# Patient Record
Sex: MALE | Race: BLACK OR AFRICAN AMERICAN | Employment: FULL TIME | URBAN - METROPOLITAN AREA
[De-identification: names, ages, dates, MRNs, and addresses within clinical notes are randomized per-mention and may not be internally consistent; named-entity substitution may affect disease eponyms.]

---

## 2018-02-13 ENCOUNTER — OFFICE VISIT (OUTPATIENT)
Dept: FAMILY MEDICINE CLINIC | Facility: CLINIC | Age: 38
End: 2018-02-13

## 2018-02-13 VITALS
HEIGHT: 74 IN | DIASTOLIC BLOOD PRESSURE: 70 MMHG | RESPIRATION RATE: 12 BRPM | TEMPERATURE: 97 F | BODY MASS INDEX: 25.93 KG/M2 | WEIGHT: 202 LBS | HEART RATE: 76 BPM | SYSTOLIC BLOOD PRESSURE: 108 MMHG

## 2018-02-13 DIAGNOSIS — J01.90 ACUTE SINUSITIS, RECURRENCE NOT SPECIFIED, UNSPECIFIED LOCATION: Primary | ICD-10-CM

## 2018-02-13 DIAGNOSIS — L73.9 FOLLICULITIS: ICD-10-CM

## 2018-02-13 PROCEDURE — 99213 OFFICE O/P EST LOW 20 MIN: CPT | Performed by: FAMILY MEDICINE

## 2018-02-13 RX ORDER — AMOXICILLIN AND CLAVULANATE POTASSIUM 875; 125 MG/1; MG/1
1 TABLET, FILM COATED ORAL 2 TIMES DAILY
Qty: 20 TABLET | Refills: 0 | Status: SHIPPED | OUTPATIENT
Start: 2018-02-13 | End: 2018-02-21 | Stop reason: SINTOL

## 2018-02-13 NOTE — PROGRESS NOTES
CHIEF COMPLAINT:   Sick  HPI:   Modesta Worthington is a 40year old male who presents for upper respiratory symptoms for  12  days. Patient reports congestion. Hot and cold feeling. Throat feels swollen. Cough. Dry cough.    PND in AM.   Some left maxilla masses, no adenopathy.   LUNGS: clear to auscultation, no W/R/R.  CARDIO: RRR without pathologic murmur    ASSESSMENT AND PLAN:   Acute sinusitis, recurrence not specified, unspecified location  (primary encounter diagnosis)  Folliculitis    No orders of th

## 2018-02-21 ENCOUNTER — TELEPHONE (OUTPATIENT)
Dept: FAMILY MEDICINE CLINIC | Facility: CLINIC | Age: 38
End: 2018-02-21

## 2018-02-21 DIAGNOSIS — R11.10 VOMITING, INTRACTABILITY OF VOMITING NOT SPECIFIED, PRESENCE OF NAUSEA NOT SPECIFIED, UNSPECIFIED VOMITING TYPE: ICD-10-CM

## 2018-02-21 DIAGNOSIS — R19.7 DIARRHEA, UNSPECIFIED TYPE: Primary | ICD-10-CM

## 2018-02-21 NOTE — TELEPHONE ENCOUNTER
Severe diarrhea, stomach rumbling all the time, vomited last night. Hungry but not able to eat. Sinus infection is improving.

## 2018-02-21 NOTE — TELEPHONE ENCOUNTER
Stop antibiotic. If lightheaded, tachycardia, or continued vomiting today, to ER for evaluation. Check C diff.

## 2018-02-21 NOTE — TELEPHONE ENCOUNTER
Discussed below with pt. Reinforced supportive care for the diarrhea. He voiced understanding. C-diff pended. He agrees to  kit tomorrow at the lab if sx's still persisting. Please sign thanks.

## 2018-02-21 NOTE — TELEPHONE ENCOUNTER
Spoke with the patient and he stated that since about the 2-3 day after starting antibiotics he has   Been having severe diarrhea and vomiting. He can't eat nor can he get his stomach to settle.   He stated that he is afraid to take another pill because of

## 2019-01-15 ENCOUNTER — OFFICE VISIT (OUTPATIENT)
Dept: FAMILY MEDICINE CLINIC | Facility: CLINIC | Age: 39
End: 2019-01-15
Payer: COMMERCIAL

## 2019-01-15 VITALS
RESPIRATION RATE: 20 BRPM | HEIGHT: 74 IN | WEIGHT: 203 LBS | TEMPERATURE: 98 F | BODY MASS INDEX: 26.05 KG/M2 | SYSTOLIC BLOOD PRESSURE: 122 MMHG | DIASTOLIC BLOOD PRESSURE: 80 MMHG | OXYGEN SATURATION: 99 % | HEART RATE: 72 BPM

## 2019-01-15 DIAGNOSIS — M62.830 MUSCLE SPASM OF BACK: Primary | ICD-10-CM

## 2019-01-15 PROCEDURE — 99214 OFFICE O/P EST MOD 30 MIN: CPT | Performed by: FAMILY MEDICINE

## 2019-01-15 RX ORDER — CYCLOBENZAPRINE HCL 10 MG
10 TABLET ORAL NIGHTLY PRN
Qty: 30 TABLET | Refills: 0 | Status: SHIPPED | OUTPATIENT
Start: 2019-01-15 | End: 2021-06-16

## 2019-01-15 RX ORDER — PREDNISONE 20 MG/1
TABLET ORAL
Qty: 20 TABLET | Refills: 0 | Status: SHIPPED | OUTPATIENT
Start: 2019-01-15 | End: 2021-08-11 | Stop reason: ALTCHOICE

## 2019-01-15 NOTE — PROGRESS NOTES
[facility] Office Note  Chief Complaint:   Patient presents with:  Back Pain      HPI:   This is a 41year old male coming in for back pain. Pain is located at low back. Pain is described as aching, cramping, sharp.  Severity is m chest discomfort. No palpitations or edema.   Denies any dyspnea on exertion or at rest  RESPIRATORY:  Denies shortness of breath, cough  GASTROINTESTINAL:  Denies any abdominal pain, nausea, vomiting  NEUROLOGICAL:  Denies headache, dizziness, syncope, num x 3d, 2 tabs PO daily x 3d, 1 tab PO daily x 3d, 1/2 tab PO daily x 3d       Health Maintenance:  Annual Depression Screen due on 07/26/1993  Annual Physical due on 04/20/2016  Influenza Vaccine(1) due on 09/01/2018    Patient/Caregiver Education: Debbie Cornell

## 2019-10-16 NOTE — LETTER
Date: 7/23/2021    Patient Name: Kye Rojas. To Whom it may concern: This letter has been written at the patient's request. The above patient was seen at the [facility] for treatment of a medical condition.     This patient
Yes...

## 2019-11-22 ENCOUNTER — HOSPITAL ENCOUNTER (OUTPATIENT)
Age: 39
Discharge: HOME | End: 2019-11-22
Payer: COMMERCIAL

## 2019-11-22 VITALS
TEMPERATURE: 98 F | OXYGEN SATURATION: 97 % | SYSTOLIC BLOOD PRESSURE: 129 MMHG | DIASTOLIC BLOOD PRESSURE: 79 MMHG | RESPIRATION RATE: 16 BRPM | HEART RATE: 73 BPM

## 2019-11-22 DIAGNOSIS — L02.31 LEFT BUTTOCK ABSCESS: Primary | ICD-10-CM

## 2019-11-22 PROCEDURE — 87077 CULTURE AEROBIC IDENTIFY: CPT | Performed by: PHYSICIAN ASSISTANT

## 2019-11-22 PROCEDURE — 87070 CULTURE OTHR SPECIMN AEROBIC: CPT | Performed by: PHYSICIAN ASSISTANT

## 2019-11-22 PROCEDURE — 87186 SC STD MICRODIL/AGAR DIL: CPT | Performed by: PHYSICIAN ASSISTANT

## 2019-11-22 PROCEDURE — 10060 I&D ABSCESS SIMPLE/SINGLE: CPT

## 2019-11-22 PROCEDURE — 87205 SMEAR GRAM STAIN: CPT | Performed by: PHYSICIAN ASSISTANT

## 2019-11-22 PROCEDURE — 99214 OFFICE O/P EST MOD 30 MIN: CPT

## 2019-11-22 PROCEDURE — 99204 OFFICE O/P NEW MOD 45 MIN: CPT

## 2019-11-22 PROCEDURE — 90471 IMMUNIZATION ADMIN: CPT

## 2019-11-22 RX ORDER — CEPHALEXIN 500 MG/1
500 CAPSULE ORAL 4 TIMES DAILY
Qty: 40 CAPSULE | Refills: 0 | Status: SHIPPED | OUTPATIENT
Start: 2019-11-22 | End: 2019-12-02

## 2019-11-22 RX ORDER — CHLORHEXIDINE GLUCONATE 4 G/100ML
30 SOLUTION TOPICAL
Qty: 1 BOTTLE | Refills: 0 | Status: SHIPPED | OUTPATIENT
Start: 2019-11-22 | End: 2021-06-07 | Stop reason: ALTCHOICE

## 2019-11-22 NOTE — ED PROVIDER NOTES
Patient Seen in: [facility]      History   Patient presents with:  Bump    Stated Complaint: 2 DAYS NOTICED \"BUMP\" ON BUTTOCKS    HPI    50-year-old male here with complaint of a tender bump he noted to his left buttock.   Antonieta and external ear normal.      Left Ear: Tympanic membrane and external ear normal.      Nose: Nose normal.      Mouth/Throat:      Mouth: Mucous membranes are moist.   Eyes:      Conjunctiva/sclera: Conjunctivae normal.      Pupils: Pupils are equal, round understanding. All questions and concerns are addressed to the patients satisfaction prior to discharge today.                  Disposition and Plan     Clinical Impression:  Left buttock abscess  (primary encounter diagnosis)    Disposition:  Discharge  11

## 2019-11-22 NOTE — ED INITIAL ASSESSMENT (HPI)
Left buttock-  Noted yesterday with pain . No drainage noted. He states it was about the half a golf ball under then skin. Today he states it subsided a little.    Denies fever,  Pt has h/o abscess in the past.

## 2020-01-08 RX ORDER — OSELTAMIVIR PHOSPHATE 75 MG/1
75 CAPSULE ORAL DAILY
Qty: 10 CAPSULE | Refills: 0 | Status: SHIPPED | OUTPATIENT
Start: 2020-01-08 | End: 2021-08-11 | Stop reason: ALTCHOICE

## 2020-03-16 ENCOUNTER — TELEPHONE (OUTPATIENT)
Dept: FAMILY MEDICINE CLINIC | Facility: CLINIC | Age: 40
End: 2020-03-16

## 2020-03-16 NOTE — TELEPHONE ENCOUNTER
Patient states his wife called in and she called in and was told to watch symptoms. Patient states that he is feeling better and is needing a note so that he can go back to work.

## 2020-03-16 NOTE — TELEPHONE ENCOUNTER
Review of record shows no recent call from pt prior to today re not feeling well. Call to pt for more information re any symptoms-he sts \"my father just  seconds ago. Don't want to talk about anything else right now. \"  Offered my condolences and ad

## 2020-07-23 ENCOUNTER — TELEPHONE (OUTPATIENT)
Dept: FAMILY MEDICINE CLINIC | Facility: CLINIC | Age: 40
End: 2020-07-23

## 2020-07-23 NOTE — TELEPHONE ENCOUNTER
S/W pt and he states he had diarrhea 4-6 x yesterday with stool appearing dark brown in appearance. Then at 9 PM last night he c/o right mid stomach cramping with no diarrhea. Also c/o bright blood when he wiped.       Pt denies any nausea and vomiting, n

## 2020-07-23 NOTE — TELEPHONE ENCOUNTER
1. What are your symptoms? Blood on tp when wiping 4 times yesterday stool darker/diarrhea, sever stomach cramps       2. How long have you been having these symptoms? yesterday    3. Have you done anything already to treat your symptoms?          ADDIT

## 2020-07-23 NOTE — TELEPHONE ENCOUNTER
Monitor symptoms. It is common to have mild abdominal cramping after persistent diarrhea. He also likely has blood when he wipes because he is gone to the bathroom so many times and is probably irritated.   As long as he is not having any fever or worseni

## 2020-07-23 NOTE — TELEPHONE ENCOUNTER
Pt called back. Advised of below. He voiced understanding. I also suggested BRAT diet, bland foods, avoid dairy. He agrees.

## 2021-06-07 ENCOUNTER — OFFICE VISIT (OUTPATIENT)
Dept: FAMILY MEDICINE CLINIC | Facility: CLINIC | Age: 41
End: 2021-06-07
Payer: COMMERCIAL

## 2021-06-07 VITALS
HEIGHT: 73 IN | WEIGHT: 200 LBS | SYSTOLIC BLOOD PRESSURE: 110 MMHG | DIASTOLIC BLOOD PRESSURE: 80 MMHG | BODY MASS INDEX: 26.51 KG/M2 | HEART RATE: 86 BPM | RESPIRATION RATE: 18 BRPM | TEMPERATURE: 99 F | OXYGEN SATURATION: 97 %

## 2021-06-07 DIAGNOSIS — S39.012A STRAIN OF LUMBAR REGION, INITIAL ENCOUNTER: ICD-10-CM

## 2021-06-07 DIAGNOSIS — S16.1XXA STRAIN OF NECK MUSCLE, INITIAL ENCOUNTER: Primary | ICD-10-CM

## 2021-06-07 PROCEDURE — 3079F DIAST BP 80-89 MM HG: CPT | Performed by: FAMILY MEDICINE

## 2021-06-07 PROCEDURE — 3008F BODY MASS INDEX DOCD: CPT | Performed by: FAMILY MEDICINE

## 2021-06-07 PROCEDURE — 3074F SYST BP LT 130 MM HG: CPT | Performed by: FAMILY MEDICINE

## 2021-06-07 PROCEDURE — 99214 OFFICE O/P EST MOD 30 MIN: CPT | Performed by: FAMILY MEDICINE

## 2021-06-07 RX ORDER — PREDNISONE 20 MG/1
TABLET ORAL
Qty: 20 TABLET | Refills: 0 | Status: SHIPPED | OUTPATIENT
Start: 2021-06-07 | End: 2021-06-16

## 2021-06-07 RX ORDER — CYCLOBENZAPRINE HCL 10 MG
10 TABLET ORAL NIGHTLY PRN
Qty: 30 TABLET | Refills: 0 | Status: SHIPPED | OUTPATIENT
Start: 2021-06-07 | End: 2021-08-16

## 2021-06-07 NOTE — PROGRESS NOTES
[address] [facility] Office Note  Chief Complaint:   Patient presents with:  Low Back Pain: upper back, right shoulder, right neck, right buttocks      HPI:   This is a 45year old male coming in for neck and back pain.   The patient has com Phosphate (TAMIFLU) 75 MG Oral Cap Take 1 capsule (75 mg total) by mouth daily. (Patient not taking: Reported on 6/7/2021 ) 10 capsule 0   • Cyclobenzaprine HCl 10 MG Oral Tab Take 1 tablet (10 mg total) by mouth nightly as needed for Muscle spasms.  (Dajuan rate and rhythm, no murmurs, rubs or gallops  ABDOMEN:  Soft, nondistended, nontender, bowel sounds normal in all 4 quadrants, no hepatosplenomegaly  EXTREMITIES:  Strength intact with 5/5 bilaterally upper and lower extremities, no edema noted  BACK: + ti learning. Medical education done. Outcome: Patient verbalizes understanding. Patient is notified to call with any questions, complications, allergies, or worsening or changing symptoms.   Patient is to call with any side effects or complications from the

## 2021-06-08 ENCOUNTER — TELEPHONE (OUTPATIENT)
Dept: FAMILY MEDICINE CLINIC | Facility: CLINIC | Age: 41
End: 2021-06-08

## 2021-06-08 DIAGNOSIS — S39.012A STRAIN OF LUMBAR REGION, INITIAL ENCOUNTER: Primary | ICD-10-CM

## 2021-06-08 DIAGNOSIS — M54.50 LOW BACK PAIN, UNSPECIFIED BACK PAIN LATERALITY, UNSPECIFIED CHRONICITY, UNSPECIFIED WHETHER SCIATICA PRESENT: ICD-10-CM

## 2021-06-08 NOTE — TELEPHONE ENCOUNTER
PT WOULD LIKE TO L;OOK INTO FMLA OR disability. They said he would need updated testing MRI ect. He would like to know where to start. Please advise. Thank you.

## 2021-06-08 NOTE — TELEPHONE ENCOUNTER
He should be able to obtain info on these from his employer. Has he checked there? Once he receives forms that can help better guide on what is needed.

## 2021-06-09 NOTE — TELEPHONE ENCOUNTER
If patient is looking for FMLA based on his back pain, he would have to see a specialist moving forward. Please refer him to Dr. Jessica Pillai from neurosurgery. He will evaluate the patient in order any appropriate tests.

## 2021-06-09 NOTE — TELEPHONE ENCOUNTER
Patient called back. He received forms for from his employer and HR told him he needs to have an  MRI and any testing for his chronic back pain in order to have [place] approved.  HR also asked him to get a note from his doctor stating that patient needs accom

## 2021-06-16 ENCOUNTER — OFFICE VISIT (OUTPATIENT)
Dept: SURGERY | Facility: CLINIC | Age: 41
End: 2021-06-16
Payer: COMMERCIAL

## 2021-06-16 VITALS
WEIGHT: 200 LBS | DIASTOLIC BLOOD PRESSURE: 90 MMHG | SYSTOLIC BLOOD PRESSURE: 132 MMHG | BODY MASS INDEX: 26.51 KG/M2 | HEIGHT: 73 IN

## 2021-06-16 DIAGNOSIS — M62.830 SPASM OF THORACIC BACK MUSCLE: ICD-10-CM

## 2021-06-16 DIAGNOSIS — M62.838 MUSCLE SPASM: ICD-10-CM

## 2021-06-16 DIAGNOSIS — M51.36 DDD (DEGENERATIVE DISC DISEASE), LUMBAR: ICD-10-CM

## 2021-06-16 DIAGNOSIS — M47.816 ARTHRITIS OF FACET JOINT OF LUMBAR SPINE: ICD-10-CM

## 2021-06-16 DIAGNOSIS — M54.2 TRIGGER POINT WITH NECK PAIN: ICD-10-CM

## 2021-06-16 DIAGNOSIS — M53.3 PAIN OF RIGHT SACROILIAC JOINT: ICD-10-CM

## 2021-06-16 DIAGNOSIS — M51.26 BULGING LUMBAR DISC: ICD-10-CM

## 2021-06-16 DIAGNOSIS — M62.89 MUSCLE TIGHTNESS: ICD-10-CM

## 2021-06-16 DIAGNOSIS — M47.817 LUMBOSACRAL FACET JOINT SYNDROME: Primary | ICD-10-CM

## 2021-06-16 PROCEDURE — 99204 OFFICE O/P NEW MOD 45 MIN: CPT | Performed by: PHYSICIAN ASSISTANT

## 2021-06-16 PROCEDURE — 3008F BODY MASS INDEX DOCD: CPT | Performed by: PHYSICIAN ASSISTANT

## 2021-06-16 PROCEDURE — 3075F SYST BP GE 130 - 139MM HG: CPT | Performed by: PHYSICIAN ASSISTANT

## 2021-06-16 PROCEDURE — 3080F DIAST BP >= 90 MM HG: CPT | Performed by: PHYSICIAN ASSISTANT
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
## 2021-06-16 NOTE — PROGRESS NOTES
Patient: Junie Lomax.   Medical Record Number: [id number]  YOB: 1980  PCP: Contreras Moran MD    Referring Physician: Rome Tabor  Reason for visit: Patient presents with:  New Patient: Back pain      Dear Dr. Yunior Delcid on file.    Family History   Problem Relation Age of Onset   • Other (Other[other]) Father         Alcoholic   • Other (Other[other]) Sister         lupus      Social History    Socioeconomic History      Marital status:       Spouse name: Not on ana is outlined in the above HPI. PHYSICAL EXAMIMATION    vitals were not taken for this visit. GENERAL: Very pleasant patient is in no apparent distress. Sitting comfortably in the examination chair. HEENT: Normocephalic, atraumatic.   RESPIRATORY RATE ([id number]) [id number]                                                                                            Exam Date: 01/14/2011                EXAMS: [id number] SPINE LUMBAR W\O CONTRAST MRI            PROCEDURE:     MRI OF THE LUMBAR SPINE WITHOUT CONTRAST compromise                 CONCLUSION:     Degenerative disc disease with multilevel bulge.                      Moderate central L5-S1 protrusion.  Mild narrowing of the L5            PAGE 1               Signed Report Printed From [facility]    (CONTINUED)      disc bulge without significant spinal stenosis. - Ordered today:    - XR lumbar spine with flex/ext:     - Assess mechanical back pain to rule out other contributing factors beyond lumbar facet syndrome   3. Activity:    - New PT order.  Patient unable t

## 2021-07-16 ENCOUNTER — TELEPHONE (OUTPATIENT)
Dept: PHYSICAL THERAPY | Facility: HOSPITAL | Age: 41
End: 2021-07-16

## 2021-07-19 ENCOUNTER — APPOINTMENT (OUTPATIENT)
Dept: PHYSICAL THERAPY | Facility: HOSPITAL | Age: 41
End: 2021-07-19
Attending: FAMILY MEDICINE

## 2021-07-22 ENCOUNTER — TELEPHONE (OUTPATIENT)
Dept: SURGERY | Facility: CLINIC | Age: 41
End: 2021-07-22

## 2021-07-22 RX ORDER — METHYLPREDNISOLONE 4 MG/1
TABLET ORAL
Qty: 1 EACH | Refills: 0 | Status: SHIPPED | OUTPATIENT
Start: 2021-07-22 | End: 2021-08-06

## 2021-07-22 NOTE — TELEPHONE ENCOUNTER
Attempted to call pt to assess symptoms, pt was not available to take the call and VMbox has not been set up, unable to leave message.

## 2021-07-22 NOTE — TELEPHONE ENCOUNTER
Pt states he had some relief from pain after taking steroid. Now pain is back and is even worse. Muscle relaxer is not helping. Pt states it is excruciating.   Pls call pt to discuss/advise

## 2021-07-22 NOTE — TELEPHONE ENCOUNTER
S: Pt calling stating his is in excrusiating pain      B: Pt seen by [place] on 6/16/21    Per Brandon Anaya   \" ASSESSMENT and PLAN:  (M49.887) Lumbosacral facet joint syndrome  (primary encounter diagnosis)     (M51.36) DDD (degenerative disc stomach and that the muscle relaxer does nothing for the pain. Experiencing pain when shirt touches back, feels like spine is bruised. Has PT eval on Monday. Pt inquiring if we have any advise on relieving pain.  Informed pt of ice/heat treatment, Tylenol,

## 2021-07-22 NOTE — TELEPHONE ENCOUNTER
Tried calling patient at mobile cell phone. Voicemail box not yet set up.     If patient calls back, please discuss if he is experiencing any new symptoms, including but not limited to warning/red flag signs/symptoms, bladder/bowel incontinence/retention o

## 2021-07-22 NOTE — TELEPHONE ENCOUNTER
Patient returned call. He states that his back pain is unbearable, also having neck and shoulder pain. It feels better to lay on his stomach or in a recliner. He is not taking any pain medication.   He states his lower back feels \"like a bruise\" but it

## 2021-07-23 ENCOUNTER — TELEPHONE (OUTPATIENT)
Dept: SURGERY | Facility: CLINIC | Age: 41
End: 2021-07-23

## 2021-07-23 ENCOUNTER — OFFICE VISIT (OUTPATIENT)
Dept: SURGERY | Facility: CLINIC | Age: 41
End: 2021-07-23
Payer: COMMERCIAL

## 2021-07-23 DIAGNOSIS — M54.12 CERVICAL MYELOPATHY WITH CERVICAL RADICULOPATHY (HCC): Primary | ICD-10-CM

## 2021-07-23 DIAGNOSIS — G95.9 CERVICAL MYELOPATHY WITH CERVICAL RADICULOPATHY (HCC): Primary | ICD-10-CM

## 2021-07-23 DIAGNOSIS — R29.898 WEAKNESS OF BOTH LOWER EXTREMITIES: ICD-10-CM

## 2021-07-23 DIAGNOSIS — R29.898 WEAKNESS OF BOTH UPPER EXTREMITIES: ICD-10-CM

## 2021-07-23 DIAGNOSIS — M51.16 LUMBAR DISC HERNIATION WITH RADICULOPATHY: ICD-10-CM

## 2021-07-23 DIAGNOSIS — R26.9 NEUROLOGIC GAIT DYSFUNCTION: ICD-10-CM

## 2021-07-23 PROCEDURE — 99215 OFFICE O/P EST HI 40 MIN: CPT | Performed by: PHYSICIAN ASSISTANT

## 2021-07-23 NOTE — TELEPHONE ENCOUNTER
Received DME orders from provider for [facility] therapy collar.   Patient face sheet, insurance information, and LOV notes printed and faxed [facility], fax number:    [phone]

## 2021-07-23 NOTE — PROGRESS NOTES
[facility]   Follow-up      HISTORY OF PRESENT ILLNESS:Harjinder Ernst. is a 45year old male gives a history of having back pain on and off since he was 14years old.  He has been able to manage up to the last couple years now he is having severe spasticity in the left lower extremity he has tight hamstrings bilaterally    He has tight paraspinal muscles in the right thoracic and lumbar region. He has difficulty bending. He was put in a [place] therapy collar his back spasms improved.  His back boris

## 2021-07-23 NOTE — PATIENT INSTRUCTIONS
Refill policies:    • Allow 2-3 business days for refills; controlled substances may take longer.   • Contact your pharmacy at least 5 days prior to running out of medication and have them send an electronic request or submit request through the “request re Depending on your insurance carrier, approval may take 3-10 days. It is highly recommended patients contact their insurance carrier directly to determine coverage.   If test is done without insurance authorization, patient may be responsible for the entire spine  -MRI of the lumbar spine  -Hold physical therapy next week and to reevaluate his neck  -Follow-up after imaging

## 2021-07-26 ENCOUNTER — APPOINTMENT (OUTPATIENT)
Dept: PHYSICAL THERAPY | Facility: HOSPITAL | Age: 41
End: 2021-07-26
Attending: FAMILY MEDICINE

## 2021-07-27 ENCOUNTER — HOSPITAL ENCOUNTER (OUTPATIENT)
Dept: GENERAL RADIOLOGY | Age: 41
Discharge: HOME | End: 2021-07-27
Attending: PHYSICIAN ASSISTANT
Payer: COMMERCIAL

## 2021-07-27 DIAGNOSIS — R26.9 NEUROLOGIC GAIT DYSFUNCTION: ICD-10-CM

## 2021-07-27 DIAGNOSIS — G95.9 CERVICAL MYELOPATHY WITH CERVICAL RADICULOPATHY (HCC): ICD-10-CM

## 2021-07-27 DIAGNOSIS — R29.898 WEAKNESS OF BOTH UPPER EXTREMITIES: ICD-10-CM

## 2021-07-27 DIAGNOSIS — R29.898 WEAKNESS OF BOTH LOWER EXTREMITIES: ICD-10-CM

## 2021-07-27 DIAGNOSIS — M54.12 CERVICAL MYELOPATHY WITH CERVICAL RADICULOPATHY (HCC): ICD-10-CM

## 2021-07-27 PROCEDURE — 72050 X-RAY EXAM NECK SPINE 4/5VWS: CPT | Performed by: PHYSICIAN ASSISTANT

## 2021-07-28 ENCOUNTER — TELEPHONE (OUTPATIENT)
Dept: SURGERY | Facility: CLINIC | Age: 41
End: 2021-07-28

## 2021-07-28 NOTE — TELEPHONE ENCOUNTER
Received Disability Paperwork from [facility] regarding claim # [id number]. Forwarded to Provider for review.

## 2021-07-28 NOTE — TELEPHONE ENCOUNTER
Patient called requesting fax number to office. Indicated Lawrence Overall will be faxing over disability [facility] paperwork to be filled out and returned by 8/6/2021.   Fax number given [phone]

## 2021-07-29 NOTE — TELEPHONE ENCOUNTER
Upon review of form it is for RTW with limitations, not FMLA forms. We have not received FMLA forms. Called pt to inform and instructed to call Jesusricardo Berenice cuadra inform them that they have sent the incorrect forms and FMLA is needed for pt to be off of work. Pt verbalized understanding and appreciative of call.

## 2021-07-30 NOTE — TELEPHONE ENCOUNTER
Received disability ppwk via fax from [employer] [address] dated 7.30.21. Endorsed to [facility].

## 2021-08-02 ENCOUNTER — TELEPHONE (OUTPATIENT)
Dept: SURGERY | Facility: CLINIC | Age: 41
End: 2021-08-02

## 2021-08-02 ENCOUNTER — APPOINTMENT (OUTPATIENT)
Dept: PHYSICAL THERAPY | Facility: HOSPITAL | Age: 41
End: 2021-08-02
Payer: COMMERCIAL

## 2021-08-02 NOTE — TELEPHONE ENCOUNTER
LMTCB:    S:  Patient came in to office to discuss message below. B: pt was seen in the office by both Sudha and Fay. Pt has been off work since 7/23/21- until next office visit- 8/6/21    Per LOV notes on 7/23/21 from [employer]

## 2021-08-02 NOTE — TELEPHONE ENCOUNTER
If patient would like to RTW without restrictions, will leave this up to the patient. If the patient would like to RTW with restrictions, we can provide letter with restrictions if that assists?     Lifting restrictions, frequent breaks etc.    If he'd l

## 2021-08-02 NOTE — TELEPHONE ENCOUNTER
Patient calling to speak with Nurse. States when he was at his last office visit his pain levels were higher than normal. Patient states he pain levels are now 5-6, which he states is normal for him.  Patient is wanting to know if he is able to return to wo

## 2021-08-03 ENCOUNTER — TELEPHONE (OUTPATIENT)
Dept: SURGERY | Facility: CLINIC | Age: 41
End: 2021-08-03

## 2021-08-03 NOTE — TELEPHONE ENCOUNTER
Received Physical Capacities Form from [employer] [address] [employer]. Endorsed to RN for completion.   OV note dated 7.23.22 included

## 2021-08-04 ENCOUNTER — TELEPHONE (OUTPATIENT)
Dept: NEUROLOGY | Facility: CLINIC | Age: 41
End: 2021-08-04

## 2021-08-04 NOTE — TELEPHONE ENCOUNTER
Attempted to call pt to discuss message below: Ch[place]    [facility] message also sent- if pt calls back make sure to review [facility] message with him

## 2021-08-04 NOTE — TELEPHONE ENCOUNTER
MRI Cervical CPT [id number] / MRI Lumbar CPT [id number] Denial reason:    CPT 93450:    This test should be used when the pain has not improved after six weeks of treatment by your doctor. Treatment should include medications and other forms of therapy.   These need

## 2021-08-04 NOTE — TELEPHONE ENCOUNTER
Received call back from pt who states he would like to remain off work until next [facility] as his work letter states.  Also inquiring about imaging; states he spoke with Chavo Arteaga [address] who stated to him that imaging orders were denied and a peer to peer will need to be com

## 2021-08-04 NOTE — TELEPHONE ENCOUNTER
Patient calling to notify Nurse that per AIM denial letter has been faxed over today. No call back necessary at this time.

## 2021-08-05 NOTE — TELEPHONE ENCOUNTER
Provider message noted. [facility] requests that providers call directly, no scheduling of P2P is necessary.       Per Skyler Alvarezma team on 8/4/21:    Santino Ac ordering provider would like to discuss this case with an [facility] physician reviewer, they can co

## 2021-08-05 NOTE — TELEPHONE ENCOUNTER
Denial of imaging received. Routed to ordering provider to inquire about scheduling P2P.      LOV notes with DARRIUS Boyce on 7/23/21:    \"IMAGING:  MRI of the lumbar spine 2011 shows minimal bulging of the lumbar spine.     ASSESSMENT:  -Cervical stenos

## 2021-08-05 NOTE — TELEPHONE ENCOUNTER
Provider message noted and routed to PA team for notification of approval.     Per DARRIUS Abraham in routing comment today:    \"Called and talked [address] [facility] MRI was approved.  Authorization number is [id number].  This order is valid

## 2021-08-05 NOTE — TELEPHONE ENCOUNTER
Yes, patient has been in 6 weeks of conservative treatment with medication therapy.  Patient with findings on exam that further support MRI cervical spine     Please schedule with next available PA

## 2021-08-06 ENCOUNTER — OFFICE VISIT (OUTPATIENT)
Dept: SURGERY | Facility: CLINIC | Age: 41
End: 2021-08-06
Payer: COMMERCIAL

## 2021-08-06 VITALS
DIASTOLIC BLOOD PRESSURE: 80 MMHG | WEIGHT: 200 LBS | SYSTOLIC BLOOD PRESSURE: 124 MMHG | BODY MASS INDEX: 26.51 KG/M2 | HEIGHT: 73 IN

## 2021-08-06 DIAGNOSIS — G95.9 CERVICAL MYELOPATHY WITH CERVICAL RADICULOPATHY (HCC): Primary | ICD-10-CM

## 2021-08-06 DIAGNOSIS — M54.12 CERVICAL MYELOPATHY WITH CERVICAL RADICULOPATHY (HCC): Primary | ICD-10-CM

## 2021-08-06 DIAGNOSIS — M51.16 LUMBAR DISC HERNIATION WITH RADICULOPATHY: ICD-10-CM

## 2021-08-06 PROCEDURE — 99214 OFFICE O/P EST MOD 30 MIN: CPT | Performed by: PHYSICIAN ASSISTANT

## 2021-08-06 PROCEDURE — 3074F SYST BP LT 130 MM HG: CPT | Performed by: PHYSICIAN ASSISTANT

## 2021-08-06 PROCEDURE — 3008F BODY MASS INDEX DOCD: CPT | Performed by: PHYSICIAN ASSISTANT

## 2021-08-06 PROCEDURE — 3079F DIAST BP 80-89 MM HG: CPT | Performed by: PHYSICIAN ASSISTANT

## 2021-08-06 NOTE — PROGRESS NOTES
Had some of the imaging done  MRI sched for tomorrow    Feeling much better than initial visit, still not 100%  Still having pain every morning and night

## 2021-08-06 NOTE — PATIENT INSTRUCTIONS
Refill policies:    • Allow 2-3 business days for refills; controlled substances may take longer.   • Contact your pharmacy at least 5 days prior to running out of medication and have them send an electronic request or submit request through the “request re Depending on your insurance carrier, approval may take 3-10 days. It is highly recommended patients contact their insurance carrier directly to determine coverage.   If test is done without insurance authorization, patient may be responsible for the entire physical therapy until after his MRI and follow-up  -He was taken off of work July 23 he will be returned to work full duty on 8/9/2021. His short-term disability paperwork was completed and given to him at his appointment today.

## 2021-08-06 NOTE — PROGRESS NOTES
[facility] Neurosurgery   Follow-up      HISTORY OF PRESENT ILLNESS:Siddharthagregory MARY Roberts. is a 37year old male returns in follow-up. Overall he has made improvement with the therapy brace he states his neck pain is a 2/10. His low back pain is better to 5/10. distress. HEENT:  Normocephalic, atraumatic  SKIN: Warm, dry, no rashes. NEUROLOGICAL:  This patient is alert and orientated x 3. Speech fluent. Comprehension intact. Face is symmetrical.    SPINE: Neck pain on flexion and extension.  Sensation to lig short-term disability paperwork was completed and given to him at his appointment today.         Jose J Munoz M.S., SURENDRA Daniels  [address], [address] include 234 beds at the [facility] Kelly, [address] [place] [address]  [phone]

## 2021-08-07 ENCOUNTER — HOSPITAL ENCOUNTER (OUTPATIENT)
Dept: MRI IMAGING | Facility: HOSPITAL | Age: 41
Discharge: HOME | End: 2021-08-07
Attending: PHYSICIAN ASSISTANT
Payer: COMMERCIAL

## 2021-08-07 DIAGNOSIS — R29.898 WEAKNESS OF BOTH LOWER EXTREMITIES: ICD-10-CM

## 2021-08-07 DIAGNOSIS — R26.9 NEUROLOGIC GAIT DYSFUNCTION: ICD-10-CM

## 2021-08-07 DIAGNOSIS — M54.12 CERVICAL MYELOPATHY WITH CERVICAL RADICULOPATHY (HCC): ICD-10-CM

## 2021-08-07 DIAGNOSIS — G95.9 CERVICAL MYELOPATHY WITH CERVICAL RADICULOPATHY (HCC): ICD-10-CM

## 2021-08-07 DIAGNOSIS — R29.898 WEAKNESS OF BOTH UPPER EXTREMITIES: ICD-10-CM

## 2021-08-09 ENCOUNTER — TELEPHONE (OUTPATIENT)
Dept: SURGERY | Facility: CLINIC | Age: 41
End: 2021-08-09

## 2021-08-09 ENCOUNTER — APPOINTMENT (OUTPATIENT)
Dept: PHYSICAL THERAPY | Facility: HOSPITAL | Age: 41
End: 2021-08-09
Payer: COMMERCIAL

## 2021-08-09 RX ORDER — DIAZEPAM 5 MG/1
5 TABLET ORAL SEE ADMIN INSTRUCTIONS
Qty: 2 TABLET | Refills: 0 | Status: SHIPPED | OUTPATIENT
Start: 2021-08-09 | End: 2021-08-16

## 2021-08-09 NOTE — TELEPHONE ENCOUNTER
Called pt to inform. No answer left VM. Called pt's spouse and informed. Pt's spouse states that he will need the day off work and requesting a work letter. Informed that we will send work letter to patients [facility].  Spouse verbalized understanding and apprecia

## 2021-08-09 NOTE — TELEPHONE ENCOUNTER
Pt's spouse calling because pt was not able to complete MRI due to anxiety. Pt would like to know if something can be prescribed to help him get through MRI scheduled for tomorrow.   [facility] [address]

## 2021-08-09 NOTE — TELEPHONE ENCOUNTER
Valium prescribed for claustrophobia. [facility]E CEJA faxed to pharmacy     Please review side effects, including but not limited to, drowsiness. Do not drive or operate machinery on this medication therapy. Patient should be driven to and from the appointment.

## 2021-08-10 ENCOUNTER — HOSPITAL ENCOUNTER (OUTPATIENT)
Dept: MRI IMAGING | Age: 41
Discharge: HOME | End: 2021-08-10
Attending: PHYSICIAN ASSISTANT
Payer: COMMERCIAL

## 2021-08-10 ENCOUNTER — TELEPHONE (OUTPATIENT)
Dept: SURGERY | Facility: CLINIC | Age: 41
End: 2021-08-10

## 2021-08-10 DIAGNOSIS — R26.9 NEUROLOGIC GAIT DYSFUNCTION: ICD-10-CM

## 2021-08-10 DIAGNOSIS — M51.26 BULGING LUMBAR DISC: ICD-10-CM

## 2021-08-10 DIAGNOSIS — R29.898 HAND WEAKNESS: ICD-10-CM

## 2021-08-10 DIAGNOSIS — M47.816 ARTHRITIS OF FACET JOINT OF LUMBAR SPINE: ICD-10-CM

## 2021-08-10 DIAGNOSIS — M54.2 NECK PAIN: Primary | ICD-10-CM

## 2021-08-10 DIAGNOSIS — R29.898 WEAKNESS OF BOTH LOWER EXTREMITIES: ICD-10-CM

## 2021-08-10 DIAGNOSIS — M62.81 MUSCLE WEAKNESS ON EXAMINATION: ICD-10-CM

## 2021-08-10 DIAGNOSIS — R25.8 CLONUS: ICD-10-CM

## 2021-08-10 NOTE — TELEPHONE ENCOUNTER
MRI cervical approved already. Peer to peer performed on MRI lumbar spine.      MRI lumbar spine approved:  [id number]  Exp 8/24/21    Dr. Houston Nice     Please advise     Given severe pain and inability to complete with valium or medication therapy, recom

## 2021-08-10 NOTE — TELEPHONE ENCOUNTER
Per 8/4/21 [facility] requests that providers call directly, no scheduling of P2P is necessary.       Per Skyler Lopezma team on 8/4/21:     \"If ordering provider would like to discuss this case with an AIM physician reviewer, they can conta

## 2021-08-10 NOTE — TELEPHONE ENCOUNTER
Per encounter dated 8/4/21 looks like only the cervical was done in the Peer to Peer. MRI Lumbar not approved. Peer to Peer can still be done.  See encounter dated 8/4/21

## 2021-08-10 NOTE — TELEPHONE ENCOUNTER
Is the patient's MRI lumbar spine approved? We're aware the MRI cervical spine is. Patient has completed 6 weeks of medication therapy. Both MRI of cervical and lumbar should be approved. If not, will reorder and perform peer to peer.      If both M

## 2021-08-10 NOTE — TELEPHONE ENCOUNTER
Pts wife, Nesha Brock, called and said pt went to have cervical MRI done today but was unable to due to being in severe pain. She is wondering what other options there are for the pt. Please call to advise.

## 2021-08-10 NOTE — TELEPHONE ENCOUNTER
Per last OV note 8-6-21   PLAN:  -[place] therapy collar continue  -MRI of the cervical spine  -MRI of the lumbar spine 8/7/2021  -Hold physical therapy until after his MRI and follow-up  -He was taken off of work July 23 he will be returned to work full dut

## 2021-08-11 RX ORDER — SODIUM CHLORIDE 9 MG/ML
INJECTION, SOLUTION INTRAVENOUS CONTINUOUS
Status: CANCELLED | OUTPATIENT
Start: 2021-08-11

## 2021-08-11 NOTE — TELEPHONE ENCOUNTER
Patient states he was advised to contact central scheduling and have both MRIs completed on the same day since he will be using anesthesia. Central Scheduling cannot accommodate both MRIs to be completed. Please contact to further discuss.

## 2021-08-11 NOTE — TELEPHONE ENCOUNTER
Can we please also confirm that insurance has no issue as these MRIs were reordered yesterday with sedation? Patient has an appointment this Friday with Ashu Kennedy PA-C.   I advise that he discuss if able to tolerate the upcoming MRIs with [facility] [address]

## 2021-08-11 NOTE — TELEPHONE ENCOUNTER
If central scheduling unable to accommodate pt may need to schedule separately.  Pt scheduled for 8/16/21 and 8/18/21 to complete imaging order    Pt currently prescribed Valium to complete first imaging appointment    Pt will need additional medication to

## 2021-08-11 NOTE — TELEPHONE ENCOUNTER
Called pt and discussed message below from [facility], offered to reschedule pt's appt for after MRIs. Pt stated he needs to be seen on Friday to discuss work status and to get additional letter for [employer].  -pt was told not to return to work until after

## 2021-08-11 NOTE — TELEPHONE ENCOUNTER
Yes, MRI Cervical and MRI Lumbar are both authorized and patient was informed this morning of authorizations.

## 2021-08-11 NOTE — TELEPHONE ENCOUNTER
Message from provider regarding MRI timing and appointment to review results received. Patient scheduled for MRI's with anesthesia to be completed on 8/16 and 8/18/21, next office visit scheduled for 8/13/21 with DARRIUS Banda.      Noted in Referral

## 2021-08-13 ENCOUNTER — LAB ENCOUNTER (OUTPATIENT)
Dept: LAB | Age: 41
End: 2021-08-13
Attending: PHYSICIAN ASSISTANT
Payer: COMMERCIAL

## 2021-08-13 ENCOUNTER — OFFICE VISIT (OUTPATIENT)
Dept: SURGERY | Facility: CLINIC | Age: 41
End: 2021-08-13
Payer: COMMERCIAL

## 2021-08-13 VITALS — DIASTOLIC BLOOD PRESSURE: 70 MMHG | SYSTOLIC BLOOD PRESSURE: 110 MMHG | HEART RATE: 72 BPM

## 2021-08-13 DIAGNOSIS — Z01.812 PRE-PROCEDURE LAB EXAM: ICD-10-CM

## 2021-08-13 DIAGNOSIS — M54.12 CERVICAL MYELOPATHY WITH CERVICAL RADICULOPATHY (HCC): Primary | ICD-10-CM

## 2021-08-13 DIAGNOSIS — G95.9 CERVICAL MYELOPATHY WITH CERVICAL RADICULOPATHY (HCC): Primary | ICD-10-CM

## 2021-08-13 PROCEDURE — 99213 OFFICE O/P EST LOW 20 MIN: CPT | Performed by: PHYSICIAN ASSISTANT

## 2021-08-13 PROCEDURE — 3078F DIAST BP <80 MM HG: CPT | Performed by: PHYSICIAN ASSISTANT

## 2021-08-13 PROCEDURE — 3074F SYST BP LT 130 MM HG: CPT | Performed by: PHYSICIAN ASSISTANT

## 2021-08-13 NOTE — PROGRESS NOTES
Pain currently in lower back and left shoulder blade currently 4/10  Yesterday pt stated he had a bad day with pain 7/10

## 2021-08-13 NOTE — PROGRESS NOTES
[facility] Neurosurgery   Follow-up      HISTORY OF PRESENT ILLNESS:Harjinder HERNANDEZ Roge Paris. is a 37year old male returns in follow-up. Since his last visit he was unable to tolerate the MRI scanner.   New orders were placed for him to have the neck and back MRIs un • Back problem    • Esophageal reflux    • Stroke [facility]) 2011    TIA       PAST SURGICAL HISTORY:  History reviewed. No pertinent surgical history. FAMILY HISTORY:  family history includes Other in his father and sister.     SOCIAL HISTORY:   reports th 1+        2+       2+              IMAGING:  MRI of the lumbar spine 2011 shows minimal bulging of the lumbar spine. X-ray of the cervical spine show loss of lordosis with spondylosis at C5-6 and C6-7.     ASSESSMENT:  -Cervical stenosis with myelopathy

## 2021-08-13 NOTE — PATIENT INSTRUCTIONS
Refill policies:    • Allow 2-3 business days for refills; controlled substances may take longer.   • Contact your pharmacy at least 5 days prior to running out of medication and have them send an electronic request or submit request through the “request re Depending on your insurance carrier, approval may take 3-10 days. It is highly recommended patients contact their insurance carrier directly to determine coverage.   If test is done without insurance authorization, patient may be responsible for the entire physical therapy until after his MRI and follow-up  -He was taken off of work July 23, he was unable to return to work August 9.   He will continue off of work until further notice.  -Follow-up after imaging

## 2021-08-14 LAB — SARS-COV-2 RNA RESP QL NAA+PROBE: NOT DETECTED

## 2021-08-16 ENCOUNTER — ANESTHESIA EVENT (OUTPATIENT)
Dept: MRI IMAGING | Facility: HOSPITAL | Age: 41
End: 2021-08-16
Payer: COMMERCIAL

## 2021-08-16 ENCOUNTER — OFFICE VISIT (OUTPATIENT)
Dept: FAMILY MEDICINE CLINIC | Facility: CLINIC | Age: 41
End: 2021-08-16
Payer: COMMERCIAL

## 2021-08-16 ENCOUNTER — ANESTHESIA (OUTPATIENT)
Dept: MRI IMAGING | Facility: HOSPITAL | Age: 41
End: 2021-08-16
Payer: COMMERCIAL

## 2021-08-16 ENCOUNTER — HOSPITAL ENCOUNTER (OUTPATIENT)
Dept: MRI IMAGING | Facility: HOSPITAL | Age: 41
Discharge: HOME | End: 2021-08-16
Attending: PHYSICIAN ASSISTANT
Payer: COMMERCIAL

## 2021-08-16 ENCOUNTER — APPOINTMENT (OUTPATIENT)
Dept: PHYSICAL THERAPY | Facility: HOSPITAL | Age: 41
End: 2021-08-16
Payer: COMMERCIAL

## 2021-08-16 VITALS
HEIGHT: 74 IN | TEMPERATURE: 97 F | DIASTOLIC BLOOD PRESSURE: 91 MMHG | HEART RATE: 92 BPM | SYSTOLIC BLOOD PRESSURE: 145 MMHG | RESPIRATION RATE: 18 BRPM | BODY MASS INDEX: 26.31 KG/M2 | OXYGEN SATURATION: 98 % | WEIGHT: 205 LBS

## 2021-08-16 VITALS
HEIGHT: 73 IN | HEART RATE: 96 BPM | RESPIRATION RATE: 16 BRPM | BODY MASS INDEX: 26.37 KG/M2 | SYSTOLIC BLOOD PRESSURE: 140 MMHG | WEIGHT: 199 LBS | TEMPERATURE: 98 F | DIASTOLIC BLOOD PRESSURE: 80 MMHG

## 2021-08-16 DIAGNOSIS — M47.816 ARTHRITIS OF FACET JOINT OF LUMBAR SPINE: ICD-10-CM

## 2021-08-16 DIAGNOSIS — M54.2 NECK PAIN: ICD-10-CM

## 2021-08-16 DIAGNOSIS — Z01.818 PREOPERATIVE CLEARANCE: Primary | ICD-10-CM

## 2021-08-16 DIAGNOSIS — R25.8 CLONUS: ICD-10-CM

## 2021-08-16 DIAGNOSIS — R26.9 NEUROLOGIC GAIT DYSFUNCTION: ICD-10-CM

## 2021-08-16 DIAGNOSIS — R29.898 WEAKNESS OF BOTH LOWER EXTREMITIES: ICD-10-CM

## 2021-08-16 DIAGNOSIS — M62.81 MUSCLE WEAKNESS ON EXAMINATION: ICD-10-CM

## 2021-08-16 DIAGNOSIS — M51.26 BULGING LUMBAR DISC: ICD-10-CM

## 2021-08-16 DIAGNOSIS — R29.898 HAND WEAKNESS: ICD-10-CM

## 2021-08-16 PROCEDURE — 99242 OFF/OP CONSLTJ NEW/EST SF 20: CPT | Performed by: FAMILY MEDICINE

## 2021-08-16 PROCEDURE — 72148 MRI LUMBAR SPINE W/O DYE: CPT | Performed by: PHYSICIAN ASSISTANT

## 2021-08-16 PROCEDURE — 3079F DIAST BP 80-89 MM HG: CPT | Performed by: FAMILY MEDICINE

## 2021-08-16 PROCEDURE — 3008F BODY MASS INDEX DOCD: CPT | Performed by: FAMILY MEDICINE

## 2021-08-16 PROCEDURE — 3077F SYST BP >= 140 MM HG: CPT | Performed by: FAMILY MEDICINE

## 2021-08-16 PROCEDURE — 72141 MRI NECK SPINE W/O DYE: CPT | Performed by: PHYSICIAN ASSISTANT

## 2021-08-16 RX ORDER — ONDANSETRON 2 MG/ML
4 INJECTION INTRAMUSCULAR; INTRAVENOUS AS NEEDED
Status: DISCONTINUED | OUTPATIENT
Start: 2021-08-16 | End: 2021-08-16

## 2021-08-16 RX ORDER — SODIUM CHLORIDE 9 MG/ML
INJECTION, SOLUTION INTRAVENOUS CONTINUOUS
Status: DISCONTINUED | OUTPATIENT
Start: 2021-08-16 | End: 2021-08-18

## 2021-08-16 RX ORDER — DEXAMETHASONE SODIUM PHOSPHATE 4 MG/ML
VIAL (ML) INJECTION AS NEEDED
Status: DISCONTINUED | OUTPATIENT
Start: 2021-08-16 | End: 2021-08-16 | Stop reason: SURG

## 2021-08-16 RX ORDER — LIDOCAINE HYDROCHLORIDE 10 MG/ML
INJECTION, SOLUTION EPIDURAL; INFILTRATION; INTRACAUDAL; PERINEURAL AS NEEDED
Status: DISCONTINUED | OUTPATIENT
Start: 2021-08-16 | End: 2021-08-16 | Stop reason: SURG

## 2021-08-16 RX ORDER — ACETAMINOPHEN 500 MG
1000 TABLET ORAL ONCE AS NEEDED
Status: DISCONTINUED | OUTPATIENT
Start: 2021-08-16 | End: 2021-08-16

## 2021-08-16 RX ORDER — ONDANSETRON 2 MG/ML
INJECTION INTRAMUSCULAR; INTRAVENOUS AS NEEDED
Status: DISCONTINUED | OUTPATIENT
Start: 2021-08-16 | End: 2021-08-16 | Stop reason: SURG

## 2021-08-16 RX ORDER — METOCLOPRAMIDE HYDROCHLORIDE 5 MG/ML
10 INJECTION INTRAMUSCULAR; INTRAVENOUS AS NEEDED
Status: DISCONTINUED | OUTPATIENT
Start: 2021-08-16 | End: 2021-08-16

## 2021-08-16 RX ORDER — NALOXONE HYDROCHLORIDE 0.4 MG/ML
80 INJECTION, SOLUTION INTRAMUSCULAR; INTRAVENOUS; SUBCUTANEOUS AS NEEDED
Status: DISCONTINUED | OUTPATIENT
Start: 2021-08-16 | End: 2021-08-16

## 2021-08-16 RX ORDER — HYDROCODONE BITARTRATE AND ACETAMINOPHEN 5; 325 MG/1; MG/1
2 TABLET ORAL AS NEEDED
Status: DISCONTINUED | OUTPATIENT
Start: 2021-08-16 | End: 2021-08-18

## 2021-08-16 RX ORDER — SODIUM CHLORIDE 9 MG/ML
INJECTION, SOLUTION INTRAVENOUS CONTINUOUS PRN
Status: DISCONTINUED | OUTPATIENT
Start: 2021-08-16 | End: 2021-08-16 | Stop reason: SURG

## 2021-08-16 RX ORDER — HYDROMORPHONE HYDROCHLORIDE 1 MG/ML
0.4 INJECTION, SOLUTION INTRAMUSCULAR; INTRAVENOUS; SUBCUTANEOUS EVERY 5 MIN PRN
Status: DISCONTINUED | OUTPATIENT
Start: 2021-08-16 | End: 2021-08-16

## 2021-08-16 RX ORDER — SODIUM CHLORIDE, SODIUM LACTATE, POTASSIUM CHLORIDE, CALCIUM CHLORIDE 600; 310; 30; 20 MG/100ML; MG/100ML; MG/100ML; MG/100ML
INJECTION, SOLUTION INTRAVENOUS CONTINUOUS
Status: DISCONTINUED | OUTPATIENT
Start: 2021-08-16 | End: 2021-08-18

## 2021-08-16 RX ORDER — HYDROCODONE BITARTRATE AND ACETAMINOPHEN 5; 325 MG/1; MG/1
1 TABLET ORAL AS NEEDED
Status: DISCONTINUED | OUTPATIENT
Start: 2021-08-16 | End: 2021-08-18

## 2021-08-16 RX ADMIN — ONDANSETRON 4 MG: 2 INJECTION INTRAMUSCULAR; INTRAVENOUS at 15:06:00

## 2021-08-16 RX ADMIN — SODIUM CHLORIDE: 9 INJECTION, SOLUTION INTRAVENOUS at 14:02:00

## 2021-08-16 RX ADMIN — LIDOCAINE HYDROCHLORIDE 50 MG: 10 INJECTION, SOLUTION EPIDURAL; INFILTRATION; INTRACAUDAL; PERINEURAL at 14:09:00

## 2021-08-16 RX ADMIN — DEXAMETHASONE SODIUM PHOSPHATE 4 MG: 4 MG/ML VIAL (ML) INJECTION at 14:24:00

## 2021-08-16 NOTE — ANESTHESIA POSTPROCEDURE EVALUATION
[address]  Patient Status:  Outpatient   Age/Gender 37year old male MRN [id number]   Location [address] [facility] Attending Thierno Herrera, [address] Innis Day # 0 PCP SADIE ESPINOZA, DO       Anesthesia Post-op Note        Pro

## 2021-08-16 NOTE — PROGRESS NOTES
[facility] Family Medicine Office Note  Chief Complaint:   Patient presents with:  Pre-Op Exam: MRI with anesthesia      HPI:   This is a 37year old male coming in for preop medical clearance for MRI of lumbar spine and MRI cervical spine with a nausea, vomiting  NEUROLOGICAL:  Denies headache, dizziness, syncope, numbness or tingling in the extremities.   MUSCULOSKELETAL:  + chronic neck and low back pain    EXAM:   /80 (BP Location: Right arm, Patient Position: Sitting, Cuff Size: adult) 04/20/2016    Patient/Caregiver Education: Patient/Caregiver Education: There are no barriers to learning. Medical education done. Outcome: Patient verbalizes understanding.  Patient is notified to call with any questions, complications, allergies, or wor

## 2021-08-16 NOTE — ANESTHESIA PREPROCEDURE EVALUATION
PRE-OP EVALUATION    Patient Name: Austen Holloway.     Admit Diagnosis: Clonus [R25.8]  Neurologic gait dysfunction [R26.9]  Weakness of both lower extremities [R29.898]  Arthritis of facet joint of lumbar spine [M47.816]  Bulging lumbar disc [M51.26] Other findings            ASA: 2   Plan: general  NPO status verified and patient meets guidelines. Post-procedure pain management plan discussed with surgeon and patient.     Comment: Risks include but limited to oral and dental injury, nausea/vomitin

## 2021-08-16 NOTE — IMAGING NOTE
Landon December. received at [facility]. For MRI Cervical and Lumbar Spine. VS checked. NPO since last night. IV access started. Transferred pt to [facility] per stretcher.

## 2021-08-16 NOTE — ANESTHESIA PROCEDURE NOTES
Airway  Date/Time: 8/16/2021 2:11 PM  Urgency: elective    Airway not difficult    General Information and Staff    Patient location during procedure: OR  Anesthesiologist: Nasim Howard MD  Performed: anesthesiologist     Indications and Patient Co

## 2021-08-18 ENCOUNTER — HOSPITAL ENCOUNTER (OUTPATIENT)
Dept: MRI IMAGING | Facility: HOSPITAL | Age: 41
Discharge: HOME | End: 2021-08-18
Attending: PHYSICIAN ASSISTANT
Payer: COMMERCIAL

## 2021-08-20 ENCOUNTER — TELEPHONE (OUTPATIENT)
Dept: SURGERY | Facility: CLINIC | Age: 41
End: 2021-08-20

## 2021-08-20 ENCOUNTER — TELEPHONE (OUTPATIENT)
Dept: PHYSICAL THERAPY | Facility: HOSPITAL | Age: 41
End: 2021-08-20

## 2021-08-20 ENCOUNTER — OFFICE VISIT (OUTPATIENT)
Dept: SURGERY | Facility: CLINIC | Age: 41
End: 2021-08-20
Payer: COMMERCIAL

## 2021-08-20 ENCOUNTER — TELEPHONE (OUTPATIENT)
Dept: FAMILY MEDICINE CLINIC | Facility: CLINIC | Age: 41
End: 2021-08-20

## 2021-08-20 DIAGNOSIS — G95.9 CERVICAL MYELOPATHY WITH CERVICAL RADICULOPATHY (HCC): Primary | ICD-10-CM

## 2021-08-20 DIAGNOSIS — M47.817 LUMBOSACRAL FACET JOINT SYNDROME: ICD-10-CM

## 2021-08-20 DIAGNOSIS — R29.898 WEAKNESS OF BOTH UPPER EXTREMITIES: ICD-10-CM

## 2021-08-20 DIAGNOSIS — R25.8 CLONUS: ICD-10-CM

## 2021-08-20 DIAGNOSIS — R29.898 WEAKNESS OF BOTH LOWER EXTREMITIES: ICD-10-CM

## 2021-08-20 DIAGNOSIS — R29.898 HAND WEAKNESS: ICD-10-CM

## 2021-08-20 DIAGNOSIS — R26.9 NEUROLOGIC GAIT DYSFUNCTION: ICD-10-CM

## 2021-08-20 DIAGNOSIS — M54.2 NECK PAIN: ICD-10-CM

## 2021-08-20 DIAGNOSIS — M62.81 MUSCLE WEAKNESS ON EXAMINATION: ICD-10-CM

## 2021-08-20 DIAGNOSIS — M51.16 LUMBAR DISC HERNIATION WITH RADICULOPATHY: ICD-10-CM

## 2021-08-20 DIAGNOSIS — M47.816 ARTHRITIS OF FACET JOINT OF LUMBAR SPINE: ICD-10-CM

## 2021-08-20 DIAGNOSIS — M54.12 CERVICAL MYELOPATHY WITH CERVICAL RADICULOPATHY (HCC): Primary | ICD-10-CM

## 2021-08-20 DIAGNOSIS — M51.26 BULGING LUMBAR DISC: ICD-10-CM

## 2021-08-20 PROCEDURE — 99214 OFFICE O/P EST MOD 30 MIN: CPT | Performed by: PHYSICIAN ASSISTANT

## 2021-08-20 NOTE — PROGRESS NOTES
[facility] Neurosurgery   Follow-up      HISTORY OF PRESENT ILLNESS:Harjinder Mohamud Ast. is a 37year old male returns in follow-up to review his imaging. 8/13/21  Since his last visit he was unable to tolerate the MRI scanner.   New orders were placed for him Medical History:   Diagnosis Date   • Back problem    • Esophageal reflux    • Stroke [facility]) 2011    TIA       PAST SURGICAL HISTORY:  History reviewed. No pertinent surgical history.     FAMILY HISTORY:  family history includes Other in his father and siste Left      1+           1+       1+        2+       2+              IMAGING:  MRI of the lumbar spine 2011 shows minimal bulging of the lumbar spine. X-ray of the cervical spine show loss of lordosis with spondylosis at C5-6 and C6-7.     MRI of the lum

## 2021-08-20 NOTE — TELEPHONE ENCOUNTER
Pt's wife called to get new PT order. Pt's appt was cx by facility today, so he needs to find new PT office and needs new order. Pt would like to know if Virginia Mueller can recommend any facilities?

## 2021-08-20 NOTE — PATIENT INSTRUCTIONS
PLAN:  -Vista therapy collar continue  -Resume physical therapy and cervical therapy  -He was taken off of work July 23,.   He will continue off of work until further notice.  -Follow-up 4 weeks if he is making no progress we will have him see Dr. Pato Hamilton

## 2021-08-20 NOTE — TELEPHONE ENCOUNTER
S/w pt. Pt sts he is wanting a referral to PT r/t ortho/spine (cervical therapy per ofc visit note w/ DARRIUS Monroy. 08/20/21). Chart review indicates referral order for [facility] PT placed 08/20/21 by DARRIUS Monroy/ neuro.     Pt informed of abo

## 2021-08-23 ENCOUNTER — APPOINTMENT (OUTPATIENT)
Dept: PHYSICAL THERAPY | Facility: HOSPITAL | Age: 41
End: 2021-08-23
Attending: FAMILY MEDICINE
Payer: COMMERCIAL

## 2021-08-23 NOTE — TELEPHONE ENCOUNTER
Ideally a location close to where he lives through THE [facility] and whoever can get him and the quickest.    The therapist that I could recommend are booking out 4 to 6 weeks.     Like to get him in sooner than that for treatment

## 2021-08-23 NOTE — TELEPHONE ENCOUNTER
.S: Pt's wife called to get new PT order. Pt's appt was cx by facility today, so he needs to find new PT office and needs new order. Pt would like to know if Aba Saunders can recommend any facilities?       B: Pt had referral for PT placed but did not placed p

## 2021-08-25 ENCOUNTER — TELEPHONE (OUTPATIENT)
Dept: PHYSICAL THERAPY | Facility: HOSPITAL | Age: 41
End: 2021-08-25

## 2021-08-27 ENCOUNTER — OFFICE VISIT (OUTPATIENT)
Dept: PHYSICAL THERAPY | Age: 41
End: 2021-08-27
Attending: PHYSICIAN ASSISTANT
Payer: COMMERCIAL

## 2021-08-27 DIAGNOSIS — G95.9 CERVICAL MYELOPATHY WITH CERVICAL RADICULOPATHY (HCC): ICD-10-CM

## 2021-08-27 DIAGNOSIS — M51.26 BULGING LUMBAR DISC: ICD-10-CM

## 2021-08-27 DIAGNOSIS — M54.2 NECK PAIN: ICD-10-CM

## 2021-08-27 DIAGNOSIS — R29.898 WEAKNESS OF BOTH LOWER EXTREMITIES: ICD-10-CM

## 2021-08-27 DIAGNOSIS — R29.898 HAND WEAKNESS: ICD-10-CM

## 2021-08-27 DIAGNOSIS — M54.12 CERVICAL MYELOPATHY WITH CERVICAL RADICULOPATHY (HCC): ICD-10-CM

## 2021-08-27 DIAGNOSIS — M47.816 ARTHRITIS OF FACET JOINT OF LUMBAR SPINE: ICD-10-CM

## 2021-08-27 DIAGNOSIS — R25.8 CLONUS: ICD-10-CM

## 2021-08-27 DIAGNOSIS — R26.9 NEUROLOGIC GAIT DYSFUNCTION: ICD-10-CM

## 2021-08-27 DIAGNOSIS — M62.81 MUSCLE WEAKNESS ON EXAMINATION: ICD-10-CM

## 2021-08-27 DIAGNOSIS — R29.898 WEAKNESS OF BOTH UPPER EXTREMITIES: ICD-10-CM

## 2021-08-27 DIAGNOSIS — M51.16 LUMBAR DISC HERNIATION WITH RADICULOPATHY: ICD-10-CM

## 2021-08-27 DIAGNOSIS — M47.817 LUMBOSACRAL FACET JOINT SYNDROME: ICD-10-CM

## 2021-08-27 PROCEDURE — 97162 PT EVAL MOD COMPLEX 30 MIN: CPT

## 2021-08-27 NOTE — PROGRESS NOTES
INITIAL EVALUATION:   Referring Physician: Dr. Rene Hernandez  Diagnosis: Cervical myelopathy with cervical radiculopathy (HCC) (G95.9,M54.12)  Weakness of both lower extremities (R29.898)  Lumbar disc herniation with radiculopathy (M51.16)  Weakness of both upp prior level of function as independent with ADLs and self-care tasks, but with increased pain and difficulty for many years. Pt goals include decreasing his complaints of pain and improving his tolerance for walking and returning to work.   Past medical his symptoms  · Patient to improve FOTO score to be at least 58/100  · Patient to ambulate community distances of at least 2000 feet with less than or equal to 3/10 pain  · Patient to report sleeping at night without waking due to complaints of pain    Peder Luis

## 2021-08-30 ENCOUNTER — APPOINTMENT (OUTPATIENT)
Dept: PHYSICAL THERAPY | Facility: HOSPITAL | Age: 41
End: 2021-08-30
Payer: COMMERCIAL

## 2021-09-01 ENCOUNTER — OFFICE VISIT (OUTPATIENT)
Dept: PHYSICAL THERAPY | Age: 41
End: 2021-09-01
Attending: PHYSICIAN ASSISTANT
Payer: COMMERCIAL

## 2021-09-01 PROCEDURE — 97110 THERAPEUTIC EXERCISES: CPT

## 2021-09-01 NOTE — PROGRESS NOTES
Dx: Cervical myelopathy with cervical radiculopathy (HCC) (G95.9,M54.12)  Weakness of both lower extremities (R29.898)  Lumbar disc herniation with radiculopathy (M51.16)  Weakness of both upper extremities (R29.898)  Neck pain (M54.2)  Clonus (R25.8)  Mus

## 2021-09-03 ENCOUNTER — MED REC SCAN ONLY (OUTPATIENT)
Dept: FAMILY MEDICINE CLINIC | Facility: CLINIC | Age: 41
End: 2021-09-03

## 2021-09-03 ENCOUNTER — OFFICE VISIT (OUTPATIENT)
Dept: PHYSICAL THERAPY | Age: 41
End: 2021-09-03
Attending: PHYSICIAN ASSISTANT
Payer: COMMERCIAL

## 2021-09-03 PROCEDURE — 97110 THERAPEUTIC EXERCISES: CPT

## 2021-09-03 NOTE — PROGRESS NOTES
Dx: Cervical myelopathy with cervical radiculopathy (HCC) (G95.9,M54.12)  Weakness of both lower extremities (R29.898)  Lumbar disc herniation with radiculopathy (M51.16)  Weakness of both upper extremities (R29.898)  Neck pain (M54.2)  Clonus (R25.8)  Mus Manual Therapy         Manual cervical distraction 20 seconds x 4        HEP: DKTC, H/L LTR, H/L clams with band    Charges:  TherEx x 3       Total Timed Treatment: 40 min  Total Treatment Time: 40 min

## 2021-09-08 ENCOUNTER — OFFICE VISIT (OUTPATIENT)
Dept: PHYSICAL THERAPY | Age: 41
End: 2021-09-08
Attending: PHYSICIAN ASSISTANT
Payer: COMMERCIAL

## 2021-09-08 PROCEDURE — 97110 THERAPEUTIC EXERCISES: CPT

## 2021-09-08 NOTE — PROGRESS NOTES
Dx: Cervical myelopathy with cervical radiculopathy (HCC) (G95.9,M54.12)  Weakness of both lower extremities (R29.898)  Lumbar disc herniation with radiculopathy (M51.16)  Weakness of both upper extremities (R29.898)  Neck pain (M54.2)  Clonus (R25.8)  Mus bilateral shoulder ER red band x 15       Supine PB DKTC x 2 min Supine horizontal abd with red band 2 x to fatigue Seated forward flexion stretch 3 x 30 sec       Reviewed HEP Seated shoulder ER red band 2 x to fatigue Seated figure 4 stretch 3 x 30 sec

## 2021-09-13 ENCOUNTER — APPOINTMENT (OUTPATIENT)
Dept: PHYSICAL THERAPY | Age: 41
End: 2021-09-13
Attending: PHYSICIAN ASSISTANT
Payer: COMMERCIAL

## 2021-09-13 ENCOUNTER — HOSPITAL ENCOUNTER (EMERGENCY)
Age: 41
Discharge: HOME | End: 2021-09-13
Attending: EMERGENCY MEDICINE
Payer: COMMERCIAL

## 2021-09-13 ENCOUNTER — TELEPHONE (OUTPATIENT)
Dept: SURGERY | Facility: CLINIC | Age: 41
End: 2021-09-13

## 2021-09-13 VITALS
OXYGEN SATURATION: 99 % | SYSTOLIC BLOOD PRESSURE: 138 MMHG | BODY MASS INDEX: 26.51 KG/M2 | TEMPERATURE: 97 F | WEIGHT: 200 LBS | HEIGHT: 73 IN | HEART RATE: 86 BPM | DIASTOLIC BLOOD PRESSURE: 98 MMHG | RESPIRATION RATE: 20 BRPM

## 2021-09-13 DIAGNOSIS — M54.16 LUMBAR RADICULOPATHY: Primary | ICD-10-CM

## 2021-09-13 PROCEDURE — 99283 EMERGENCY DEPT VISIT LOW MDM: CPT

## 2021-09-13 PROCEDURE — 96372 THER/PROPH/DIAG INJ SC/IM: CPT

## 2021-09-13 RX ORDER — HYDROCODONE BITARTRATE AND ACETAMINOPHEN 5; 325 MG/1; MG/1
1-2 TABLET ORAL EVERY 6 HOURS PRN
Qty: 10 TABLET | Refills: 0 | Status: SHIPPED | OUTPATIENT
Start: 2021-09-13 | End: 2021-09-20

## 2021-09-13 RX ORDER — METHYLPREDNISOLONE 4 MG/1
TABLET ORAL
Qty: 21 EACH | Refills: 0 | OUTPATIENT
Start: 2021-09-13

## 2021-09-13 RX ORDER — DIAZEPAM 5 MG/1
5 TABLET ORAL ONCE
Status: COMPLETED | OUTPATIENT
Start: 2021-09-13 | End: 2021-09-13

## 2021-09-13 RX ORDER — DIAZEPAM 5 MG/1
5 TABLET ORAL EVERY 12 HOURS PRN
Qty: 10 TABLET | Refills: 0 | Status: SHIPPED | OUTPATIENT
Start: 2021-09-13 | End: 2021-09-20

## 2021-09-13 RX ORDER — HYDROMORPHONE HYDROCHLORIDE 1 MG/ML
0.5 INJECTION, SOLUTION INTRAMUSCULAR; INTRAVENOUS; SUBCUTANEOUS ONCE
Status: COMPLETED | OUTPATIENT
Start: 2021-09-13 | End: 2021-09-13

## 2021-09-13 RX ORDER — METHYLPREDNISOLONE 4 MG/1
TABLET ORAL
Qty: 21 TABLET | Refills: 0 | Status: SHIPPED | OUTPATIENT
Start: 2021-09-13 | End: 2021-09-20

## 2021-09-13 RX ORDER — HYDROMORPHONE HYDROCHLORIDE 1 MG/ML
INJECTION, SOLUTION INTRAMUSCULAR; INTRAVENOUS; SUBCUTANEOUS
Status: DISCONTINUED
Start: 2021-09-13 | End: 2021-09-13

## 2021-09-13 NOTE — ED PROVIDER NOTES
Patient Seen in: THE [facility] Emergency Department In [place]      History   Patient presents with:  Back Pain    Stated Complaint: Pt sts he threw his back out today while bending over.  He's currently in physic*    Subjective:   HPI    16-year-old male wit trauma. Extraocular movements are intact. Cardiovascular exam: Regular rate and rhythm  Lungs: Clear to auscultation bilaterally. Abdomen: Soft, nondistended, nontender.   Back exam: Patient has tenderness palpation of his right paralumbar muscle group  E visit            Medications Prescribed:  Current Discharge Medication List    START taking these medications    HYDROcodone-acetaminophen 5-325 MG Oral Tab  Take 1-2 tablets by mouth every 6 (six) hours as needed for Pain.   Qty: 10 tablet Refills: 0    di

## 2021-09-13 NOTE — ED INITIAL ASSESSMENT (HPI)
Stiff and slow to move, pt sts he threw his back out today while bending over. He's currently in physical therapy for neck and back issues.

## 2021-09-13 NOTE — TELEPHONE ENCOUNTER
Patient states he threw his back out yesterday, 9/12 @ 3:30-4pm and went to the ED. Patient was told to contact Rainer RIZO to make him aware of the situation.

## 2021-09-16 ENCOUNTER — TELEPHONE (OUTPATIENT)
Dept: PHYSICAL THERAPY | Facility: HOSPITAL | Age: 41
End: 2021-09-16

## 2021-09-16 ENCOUNTER — APPOINTMENT (OUTPATIENT)
Dept: PHYSICAL THERAPY | Age: 41
End: 2021-09-16
Attending: PHYSICIAN ASSISTANT
Payer: COMMERCIAL

## 2021-09-20 ENCOUNTER — OFFICE VISIT (OUTPATIENT)
Dept: PHYSICAL THERAPY | Age: 41
End: 2021-09-20
Attending: PHYSICIAN ASSISTANT
Payer: COMMERCIAL

## 2021-09-20 ENCOUNTER — OFFICE VISIT (OUTPATIENT)
Dept: SURGERY | Facility: CLINIC | Age: 41
End: 2021-09-20
Payer: COMMERCIAL

## 2021-09-20 VITALS
BODY MASS INDEX: 26.51 KG/M2 | WEIGHT: 200 LBS | SYSTOLIC BLOOD PRESSURE: 128 MMHG | DIASTOLIC BLOOD PRESSURE: 88 MMHG | HEIGHT: 73 IN

## 2021-09-20 DIAGNOSIS — M54.12 CERVICAL MYELOPATHY WITH CERVICAL RADICULOPATHY (HCC): Primary | ICD-10-CM

## 2021-09-20 DIAGNOSIS — M51.16 LUMBAR DISC HERNIATION WITH RADICULOPATHY: ICD-10-CM

## 2021-09-20 DIAGNOSIS — G95.9 CERVICAL MYELOPATHY WITH CERVICAL RADICULOPATHY (HCC): Primary | ICD-10-CM

## 2021-09-20 PROCEDURE — 99213 OFFICE O/P EST LOW 20 MIN: CPT | Performed by: PHYSICIAN ASSISTANT

## 2021-09-20 PROCEDURE — 3079F DIAST BP 80-89 MM HG: CPT | Performed by: PHYSICIAN ASSISTANT

## 2021-09-20 PROCEDURE — 3008F BODY MASS INDEX DOCD: CPT | Performed by: PHYSICIAN ASSISTANT

## 2021-09-20 PROCEDURE — 97110 THERAPEUTIC EXERCISES: CPT

## 2021-09-20 PROCEDURE — 3074F SYST BP LT 130 MM HG: CPT | Performed by: PHYSICIAN ASSISTANT

## 2021-09-20 NOTE — PROGRESS NOTES
[facility] Neurosurgery   Follow-up      HISTORY OF PRESENT ILLNESS:Harjinder Forrest. is a 37year old male returns in follow-up. Since his last visit on August 20 he was seen in the emergency room on September 13 for lumbar flareup.   He was given Inga Habermann and off since he was 14years old. He has been able to manage up to the last couple years now he is having severe pain on a regular basis which is not manageable. He complains of cervical pain which is a 2/10.  He complains of weakness in the upper extremit pain on flexion less pain on extension lateral bending.     Upper extremity strength:       Deltoid    Triceps     Biceps        Wrist Extension  Finger extension Thumb Abduction  Thumb adduction Intrinsics   Right         5        5         5 NSAIDs        T.  Negrito Hill M.S., PA-C  [facility]  [address] [facility], [address], [address] [place] [address] Rd  [phone]

## 2021-09-20 NOTE — PROGRESS NOTES
Things are not good  More pain  Started PT, seemed like things were getting better first few sessions  After a few times, he noticed that if he did something that caused him pain it would only be for about an hour or so instead of all day    Threw back out

## 2021-09-20 NOTE — PATIENT INSTRUCTIONS
Refill policies:    • Allow 2-3 business days for refills; controlled substances may take longer.   • Contact your pharmacy at least 5 days prior to running out of medication and have them send an electronic request or submit request through the “request re Depending on your insurance carrier, approval may take 3-10 days. It is highly recommended patients contact their insurance carrier directly to determine coverage.   If test is done without insurance authorization, patient may be responsible for the entire therapy  -He was taken off of work July 23,.   He will continue off of work until further notice.  -Follow-up Dr. Abril Jenkins with the pain service to consider injections  -He has adequate medication at this time  -Over-the-counter NSAIDs

## 2021-09-20 NOTE — PROGRESS NOTES
Dx: Cervical myelopathy with cervical radiculopathy (HCC) (G95.9,M54.12)  Weakness of both lower extremities (R29.898)  Lumbar disc herniation with radiculopathy (M51.16)  Weakness of both upper extremities (R29.898)  Neck pain (M54.2)  Clonus (R25.8)  Mus right and left Seated bilateral shoulder ER red band x 15 Seated bilateral shoulder ER with red band 2 x 15      Supine PB DKTC x 2 min Supine horizontal abd with red band 2 x to fatigue Seated forward flexion stretch 3 x 30 sec Reviewed HEP      Reviewed

## 2021-09-23 ENCOUNTER — APPOINTMENT (OUTPATIENT)
Dept: PHYSICAL THERAPY | Age: 41
End: 2021-09-23
Attending: PHYSICIAN ASSISTANT
Payer: COMMERCIAL

## 2021-09-24 ENCOUNTER — OFFICE VISIT (OUTPATIENT)
Dept: PHYSICAL THERAPY | Age: 41
End: 2021-09-24
Attending: PHYSICIAN ASSISTANT
Payer: COMMERCIAL

## 2021-09-24 PROCEDURE — 97110 THERAPEUTIC EXERCISES: CPT

## 2021-09-24 NOTE — PROGRESS NOTES
Dx: Cervical myelopathy with cervical radiculopathy (HCC) (G95.9,M54.12)  Weakness of both lower extremities (R29.898)  Lumbar disc herniation with radiculopathy (M51.16)  Weakness of both upper extremities (R29.898)  Neck pain (M54.2)  Clonus (R25.8)  Mus Supine horizontal abd with red band 2 x 15     Doorway rhomboid stretch 2 x 30 sec Seated forward flexion stretch rolling large PB to middle, right and left Seated bilateral shoulder ER red band x 15 Seated bilateral shoulder ER with red band 2 x 15 Sit to The patient is a 31y Male complaining of abdominal pain.

## 2021-09-28 ENCOUNTER — OFFICE VISIT (OUTPATIENT)
Dept: PHYSICAL THERAPY | Age: 41
End: 2021-09-28
Attending: PHYSICIAN ASSISTANT
Payer: COMMERCIAL

## 2021-09-28 PROCEDURE — 97014 ELECTRIC STIMULATION THERAPY: CPT

## 2021-09-28 PROCEDURE — 97110 THERAPEUTIC EXERCISES: CPT

## 2021-09-28 NOTE — PROGRESS NOTES
Dx: Cervical myelopathy with cervical radiculopathy (HCC) (G95.9,M54.12)  Weakness of both lower extremities (R29.898)  Lumbar disc herniation with radiculopathy (M51.16)  Weakness of both upper extremities (R29.898)  Neck pain (M54.2)  Clonus (R25.8)  Mus due to increased LB pain H/L clams green band 2 x 15 Supine horizontal abd with red band x 15 Seated forward flexion stretch Supine horizontal abd with red band 2 x 15 Seated bilateral shoulder ER with red band 2 x 10    Doorway rhomboid stretch 2 x 30 sec

## 2021-09-30 ENCOUNTER — OFFICE VISIT (OUTPATIENT)
Dept: PHYSICAL THERAPY | Age: 41
End: 2021-09-30
Attending: PHYSICIAN ASSISTANT
Payer: COMMERCIAL

## 2021-09-30 PROCEDURE — 97110 THERAPEUTIC EXERCISES: CPT

## 2021-09-30 NOTE — PROGRESS NOTES
Dx: Cervical myelopathy with cervical radiculopathy (HCC) (G95.9,M54.12)  Weakness of both lower extremities (R29.898)  Lumbar disc herniation with radiculopathy (M51.16)  Weakness of both upper extremities (R29.898)  Neck pain (M54.2)  Clonus (R25.8)  Mus Not met  · Patient to ambulate community distances of at least 2000 feet with less than or equal to 3/10 pain Not met  · Patient to report sleeping at night without waking due to complaints of pain Not met      Assessment:   The patient has limited toleran flexion stretch Supine horizontal abd with red band 2 x 15 Seated bilateral shoulder ER with red band 2 x 10 Re-assessment of ROM, posture, gait   Doorway rhomboid stretch 2 x 30 sec Seated forward flexion stretch rolling large PB to middle, right and left

## 2021-10-01 ENCOUNTER — OFFICE VISIT (OUTPATIENT)
Dept: PAIN CLINIC | Facility: CLINIC | Age: 41
End: 2021-10-01
Payer: COMMERCIAL

## 2021-10-01 VITALS
SYSTOLIC BLOOD PRESSURE: 110 MMHG | HEART RATE: 97 BPM | OXYGEN SATURATION: 98 % | DIASTOLIC BLOOD PRESSURE: 80 MMHG | RESPIRATION RATE: 20 BRPM | BODY MASS INDEX: 27 KG/M2 | WEIGHT: 205 LBS

## 2021-10-01 DIAGNOSIS — M50.30 DDD (DEGENERATIVE DISC DISEASE), CERVICAL: ICD-10-CM

## 2021-10-01 DIAGNOSIS — G95.9 CERVICAL MYELOPATHY WITH CERVICAL RADICULOPATHY (HCC): Primary | ICD-10-CM

## 2021-10-01 DIAGNOSIS — M51.36 DDD (DEGENERATIVE DISC DISEASE), LUMBAR: ICD-10-CM

## 2021-10-01 DIAGNOSIS — M54.12 CERVICAL MYELOPATHY WITH CERVICAL RADICULOPATHY (HCC): Primary | ICD-10-CM

## 2021-10-01 PROBLEM — M51.369 DDD (DEGENERATIVE DISC DISEASE), LUMBAR: Status: ACTIVE | Noted: 2021-10-01

## 2021-10-01 PROCEDURE — 3079F DIAST BP 80-89 MM HG: CPT | Performed by: ANESTHESIOLOGY

## 2021-10-01 PROCEDURE — 99205 OFFICE O/P NEW HI 60 MIN: CPT | Performed by: ANESTHESIOLOGY

## 2021-10-01 PROCEDURE — 3074F SYST BP LT 130 MM HG: CPT | Performed by: ANESTHESIOLOGY

## 2021-10-01 NOTE — H&P
Name: Linda Wood : 1980   DOS: 10/1/2021     Chief complaint: Low back and neck pain    History of present illness:  Linda Wood is a 37year old right-handed male who presents today for evaluation of neck and low back pain.  The pa lymphadenopathy.    Motor Examination:    (R)   (L)  Deltoid:       5    5  Biceps:       5    5  Triceps:      5    5  Wrist Extension:     5    5  Wrist Flexsion:                 5    5  Finger Extension:     5    5  Finger Flexsion:      5    5  Cardiova rationale for this was discussed with the patient. Additionally, I suspect that there is a strong anxiety component to his overall pain picture. I did refer him to [facility] Leone for behavioral health treatment.  The importance of proper behavioral health ma

## 2021-10-01 NOTE — PROGRESS NOTES
Subjective:   Patient ID: Jose Manuel Braxton is a 37year old male. HPI    History/Other:   Review of Systems  No current outpatient medications on file.      Allergies:  Shrimp                  NAUSEA AND VOMITING    Objective:   Physical Exam  Constitu

## 2021-10-05 ENCOUNTER — TELEPHONE (OUTPATIENT)
Dept: SURGERY | Facility: CLINIC | Age: 41
End: 2021-10-05

## 2021-10-05 NOTE — TELEPHONE ENCOUNTER
Per last OV note on 9-20-21   \"PLAN:  -Vista therapy collar for cervical stenosis  -Resume physical therapy and cervical and lumbar therapy  -He was taken off of work July 23,.   He will continue off of work until further notice.  -Follow-up Dr. Joel Castillo

## 2021-10-05 NOTE — TELEPHONE ENCOUNTER
Wife Kathryn Noland called asking if Aramis Patel thought patient could return to work part time?   His disability payments are decreasing so they are trying to figure out

## 2021-10-12 NOTE — TELEPHONE ENCOUNTER
Pr read [facility] send by provider. Waiting on response from pt on how he would like to proceed.

## 2021-10-18 ENCOUNTER — TELEPHONE (OUTPATIENT)
Dept: PAIN CLINIC | Facility: CLINIC | Age: 41
End: 2021-10-18

## 2021-10-18 DIAGNOSIS — G95.9 CERVICAL MYELOPATHY WITH CERVICAL RADICULOPATHY (HCC): Primary | ICD-10-CM

## 2021-10-18 DIAGNOSIS — M54.12 CERVICAL MYELOPATHY WITH CERVICAL RADICULOPATHY (HCC): Primary | ICD-10-CM

## 2021-10-18 DIAGNOSIS — M51.36 DDD (DEGENERATIVE DISC DISEASE), LUMBAR: Primary | ICD-10-CM

## 2021-10-18 NOTE — TELEPHONE ENCOUNTER
Message from [facility] noted regarding patient's request for specifics on RTW letter. Letter initiated and pended, routed to MAGALIE Aponte, [address].

## 2021-10-18 NOTE — TELEPHONE ENCOUNTER
Question Answer   Anesthesia Type Sedation   Provider Jesus Call   Location Lab   Procedure Cervical JANINE   Medical clearance requested (will send to Pain Navigator) No   Patient has Medicare coverage?  No   Comments (Please list entire procedure name here.) cervi

## 2021-10-18 NOTE — TELEPHONE ENCOUNTER
Return to work 10/19/2021 max 4 hours a day no overhead lifting max 25 pound lifting letter was generated

## 2021-10-18 NOTE — TELEPHONE ENCOUNTER
Patient has been on approximately 6 weeks of conservative treatment with medication therapy.  Recommend peer to peer if option [Negative] : Heme/Lymph [Night Sweats] : night sweats [Palpitations] : palpitations

## 2021-10-18 NOTE — TELEPHONE ENCOUNTER
Question Answer   Anesthesia Type Sedation   Provider Jaylonella Simpler   Location Lab   Procedure Lumbar JANINE   Medical clearance requested (will send to Pain Navigator) No   Patient has Medicare coverage?  No   Comments (Please list entire procedure name here.) lumbar

## 2021-10-19 ENCOUNTER — PATIENT MESSAGE (OUTPATIENT)
Dept: SURGERY | Facility: CLINIC | Age: 41
End: 2021-10-19

## 2021-10-19 NOTE — TELEPHONE ENCOUNTER
AIM online to initiate authorization  Prior authorization request completed for: PRADEEP with sedation    Injection Series: 1   Authorization # [id number]  Authorization dates: 10/20/2021-11/18/21  CPT codes approved: 31632  Number of visits/dates of service a

## 2021-10-19 NOTE — TELEPHONE ENCOUNTER
AIM online to initiate authorization  Prior authorization request completed for: Lumbar JANINE   Injection Series: 1   Authorization # [id number]  Authorization dates: 10/21/21-11/19/21  CPT codes approved: 90456  Number of visits/dates of service approved: 1

## 2021-10-21 NOTE — TELEPHONE ENCOUNTER
Patient advised of insurance approval to proceed with injections and is agreeable to scheduling. Patient scheduled for procedure, pre-procedure instructions reviewed. Patient prefers conscious sedation.  Reviewed sedation instructions including fasting of 8 Insurance Card, Photo ID, List of Current Medications and Referral (if applicable) to your appointment. Check in at [facility] ([address] [facility]. [address], [place], [place]) outpatient registration in the [employer].   • Please note-No prescriptions will be your procedure within 48 hours of the scheduled date, your procedure will be cancelled and rescheduled to a later date. Please contact your insurance carrier to determine what your financial  responsibility will be for the procedure(s).     Cancellation/R

## 2021-10-21 NOTE — ADDENDUM NOTE
Addended by: Cristhian Boss on: 10/21/2021 09:40 AM     Modules accepted: Orders Nsaids Pregnancy And Lactation Text: These medications are considered safe up to 30 weeks gestation. It is excreted in breast milk.

## 2021-10-21 NOTE — TELEPHONE ENCOUNTER
Patient advised of insurance approval to proceed with injections and is agreeable to scheduling. Patient scheduled for procedure, pre-procedure instructions reviewed. Patient prefers conscious sedation.  Reviewed sedation instructions including fasting of 8 Insurance Card, Photo ID, List of Current Medications and Referral (if applicable) to your appointment. Check in at [facility] ([address]. [place] [address]., [place]) outpatient registration in the [employer].   • Please note-No prescriptions will be your procedure within 48 hours of the scheduled date, your procedure will be cancelled and rescheduled to a later date. Please contact your insurance carrier to determine what your financial  responsibility will be for the procedure(s).     Cancellation/R

## 2021-10-25 ENCOUNTER — LAB ENCOUNTER (OUTPATIENT)
Dept: LAB | Facility: HOSPITAL | Age: 41
End: 2021-10-25
Attending: ANESTHESIOLOGY
Payer: COMMERCIAL

## 2021-10-25 DIAGNOSIS — G95.9 CERVICAL MYELOPATHY WITH CERVICAL RADICULOPATHY (HCC): ICD-10-CM

## 2021-10-25 DIAGNOSIS — M54.12 CERVICAL MYELOPATHY WITH CERVICAL RADICULOPATHY (HCC): ICD-10-CM

## 2021-10-25 NOTE — TELEPHONE ENCOUNTER
Call transferred by [facility]. Pt is asking about Covid test, stating that he was informed that the test would be scheduled for him however he does not see this in his chart.  Advised pt that we do not schedule Covid test, we simply place order in chart and h

## 2021-10-28 ENCOUNTER — APPOINTMENT (OUTPATIENT)
Dept: GENERAL RADIOLOGY | Facility: HOSPITAL | Age: 41
End: 2021-10-28
Attending: ANESTHESIOLOGY
Payer: COMMERCIAL

## 2021-10-28 ENCOUNTER — HOSPITAL ENCOUNTER (OUTPATIENT)
Facility: HOSPITAL | Age: 41
Setting detail: HOSPITAL OUTPATIENT SURGERY
Discharge: HOME | End: 2021-10-28
Attending: ANESTHESIOLOGY | Admitting: ANESTHESIOLOGY
Payer: COMMERCIAL

## 2021-10-28 VITALS
DIASTOLIC BLOOD PRESSURE: 80 MMHG | TEMPERATURE: 97 F | RESPIRATION RATE: 16 BRPM | OXYGEN SATURATION: 97 % | SYSTOLIC BLOOD PRESSURE: 129 MMHG | HEART RATE: 70 BPM

## 2021-10-28 DIAGNOSIS — M54.12 CERVICAL MYELOPATHY WITH CERVICAL RADICULOPATHY (HCC): ICD-10-CM

## 2021-10-28 DIAGNOSIS — G95.9 CERVICAL MYELOPATHY WITH CERVICAL RADICULOPATHY (HCC): ICD-10-CM

## 2021-10-28 PROCEDURE — 3E0R33Z INTRODUCTION OF ANTI-INFLAMMATORY INTO SPINAL CANAL, PERCUTANEOUS APPROACH: ICD-10-PCS | Performed by: ANESTHESIOLOGY

## 2021-10-28 PROCEDURE — 99152 MOD SED SAME PHYS/QHP 5/>YRS: CPT | Performed by: ANESTHESIOLOGY

## 2021-10-28 RX ORDER — DIPHENHYDRAMINE HYDROCHLORIDE 50 MG/ML
50 INJECTION INTRAMUSCULAR; INTRAVENOUS ONCE AS NEEDED
Status: DISCONTINUED | OUTPATIENT
Start: 2021-10-28 | End: 2021-10-28

## 2021-10-28 RX ORDER — LIDOCAINE HYDROCHLORIDE 10 MG/ML
INJECTION, SOLUTION EPIDURAL; INFILTRATION; INTRACAUDAL; PERINEURAL
Status: DISCONTINUED | OUTPATIENT
Start: 2021-10-28 | End: 2021-10-28

## 2021-10-28 RX ORDER — SODIUM CHLORIDE, SODIUM LACTATE, POTASSIUM CHLORIDE, CALCIUM CHLORIDE 600; 310; 30; 20 MG/100ML; MG/100ML; MG/100ML; MG/100ML
100 INJECTION, SOLUTION INTRAVENOUS CONTINUOUS
Status: DISCONTINUED | OUTPATIENT
Start: 2021-10-28 | End: 2021-10-28

## 2021-10-28 RX ORDER — ONDANSETRON 2 MG/ML
4 INJECTION INTRAMUSCULAR; INTRAVENOUS ONCE AS NEEDED
Status: DISCONTINUED | OUTPATIENT
Start: 2021-10-28 | End: 2021-10-28

## 2021-10-28 RX ORDER — MIDAZOLAM HYDROCHLORIDE 1 MG/ML
INJECTION INTRAMUSCULAR; INTRAVENOUS
Status: DISCONTINUED | OUTPATIENT
Start: 2021-10-28 | End: 2021-10-28

## 2021-10-28 RX ORDER — SODIUM CHLORIDE 9 MG/ML
INJECTION INTRAVENOUS
Status: DISCONTINUED | OUTPATIENT
Start: 2021-10-28 | End: 2021-10-28

## 2021-10-28 RX ORDER — DEXAMETHASONE SODIUM PHOSPHATE 10 MG/ML
INJECTION, SOLUTION INTRAMUSCULAR; INTRAVENOUS
Status: DISCONTINUED | OUTPATIENT
Start: 2021-10-28 | End: 2021-10-28

## 2021-10-28 NOTE — OPERATIVE REPORT
[facility]  Operative Report  10/28/2021     Adrien Olson.  Patient Status:  Hospital Outpatient Surgery    1980 MRN [id number]   Location [address] [place] [address] Attending Ladi Lynn MD   Hosp Day # 0 PCP Beau De Santiago was no [place] or blood through the needle. After obtaining a good epidurogram by injecting Omnipaque 180 1 mL, a combination of normal saline and dexamethasone 10 mg in total volume of 4 mL was injected.   The needle was then flushed with normal saline 1 mL

## 2021-10-28 NOTE — H&P
History & Physical Examination    Patient Name: Lynn Osorio   MRN: [id number]  CSN: [id number]  YOB: 1980    Pre-Operative Diagnosis:  Cervical myelopathy with cervical radiculopathy ([facility] [G95.9, M54.12]    Present Illness: Patient with

## 2021-11-01 ENCOUNTER — LAB ENCOUNTER (OUTPATIENT)
Dept: LAB | Facility: HOSPITAL | Age: 41
End: 2021-11-01
Attending: ANESTHESIOLOGY
Payer: COMMERCIAL

## 2021-11-01 DIAGNOSIS — M51.36 DDD (DEGENERATIVE DISC DISEASE), LUMBAR: ICD-10-CM

## 2021-11-04 ENCOUNTER — HOSPITAL ENCOUNTER (OUTPATIENT)
Facility: HOSPITAL | Age: 41
Setting detail: HOSPITAL OUTPATIENT SURGERY
Discharge: HOME | End: 2021-11-04
Attending: ANESTHESIOLOGY | Admitting: ANESTHESIOLOGY
Payer: COMMERCIAL

## 2021-11-04 ENCOUNTER — APPOINTMENT (OUTPATIENT)
Dept: GENERAL RADIOLOGY | Facility: HOSPITAL | Age: 41
End: 2021-11-04
Attending: ANESTHESIOLOGY
Payer: COMMERCIAL

## 2021-11-04 VITALS
HEIGHT: 73 IN | OXYGEN SATURATION: 94 % | BODY MASS INDEX: 26.51 KG/M2 | SYSTOLIC BLOOD PRESSURE: 102 MMHG | TEMPERATURE: 98 F | WEIGHT: 200 LBS | RESPIRATION RATE: 16 BRPM | HEART RATE: 77 BPM | DIASTOLIC BLOOD PRESSURE: 77 MMHG

## 2021-11-04 DIAGNOSIS — M51.36 DDD (DEGENERATIVE DISC DISEASE), LUMBAR: ICD-10-CM

## 2021-11-04 PROCEDURE — 99152 MOD SED SAME PHYS/QHP 5/>YRS: CPT | Performed by: ANESTHESIOLOGY

## 2021-11-04 PROCEDURE — 3E0S33Z INTRODUCTION OF ANTI-INFLAMMATORY INTO EPIDURAL SPACE, PERCUTANEOUS APPROACH: ICD-10-PCS | Performed by: ANESTHESIOLOGY

## 2021-11-04 RX ORDER — ONDANSETRON 2 MG/ML
4 INJECTION INTRAMUSCULAR; INTRAVENOUS ONCE AS NEEDED
Status: DISCONTINUED | OUTPATIENT
Start: 2021-11-04 | End: 2021-11-04

## 2021-11-04 RX ORDER — MIDAZOLAM HYDROCHLORIDE 1 MG/ML
INJECTION INTRAMUSCULAR; INTRAVENOUS
Status: DISCONTINUED | OUTPATIENT
Start: 2021-11-04 | End: 2021-11-04

## 2021-11-04 RX ORDER — LIDOCAINE HYDROCHLORIDE 10 MG/ML
INJECTION, SOLUTION EPIDURAL; INFILTRATION; INTRACAUDAL; PERINEURAL
Status: DISCONTINUED | OUTPATIENT
Start: 2021-11-04 | End: 2021-11-04

## 2021-11-04 RX ORDER — SODIUM CHLORIDE 9 MG/ML
INJECTION INTRAVENOUS
Status: DISCONTINUED | OUTPATIENT
Start: 2021-11-04 | End: 2021-11-04

## 2021-11-04 RX ORDER — DIPHENHYDRAMINE HYDROCHLORIDE 50 MG/ML
50 INJECTION INTRAMUSCULAR; INTRAVENOUS ONCE AS NEEDED
Status: DISCONTINUED | OUTPATIENT
Start: 2021-11-04 | End: 2021-11-04

## 2021-11-04 RX ORDER — SODIUM CHLORIDE, SODIUM LACTATE, POTASSIUM CHLORIDE, CALCIUM CHLORIDE 600; 310; 30; 20 MG/100ML; MG/100ML; MG/100ML; MG/100ML
100 INJECTION, SOLUTION INTRAVENOUS CONTINUOUS
Status: DISCONTINUED | OUTPATIENT
Start: 2021-11-04 | End: 2021-11-04

## 2021-11-04 RX ORDER — DEXAMETHASONE SODIUM PHOSPHATE 10 MG/ML
INJECTION, SOLUTION INTRAMUSCULAR; INTRAVENOUS
Status: DISCONTINUED | OUTPATIENT
Start: 2021-11-04 | End: 2021-11-04

## 2021-11-04 NOTE — H&P
History & Physical Examination    Patient Name: Denny Crews   MRN: [id number]  CSN: [id number]  YOB: 1980    Pre-Operative Diagnosis:  DDD (degenerative disc disease), lumbar [M51.36]    Present Illness: Patient with lumbar degenerative

## 2021-11-04 NOTE — OPERATIVE REPORT
[facility]  Operative Report  2021     Jose Manuel Joseline.  Patient Status:  Hospital Outpatient Surgery    1980 MRN [id number]   Location [address] [place] [address] Attending Mellissa Pierce MD   Hosp Day # 0 PCP SADIE ENGLISH total volume of 6 mL was injected through the Tuohy needle. The needle was flushed with 1 mL lidocaine. The needle was withdrawn with the stylet intact in situ. The needle's tip was intact.   The patient tolerated the procedure very well without signific

## 2021-11-09 ENCOUNTER — TELEPHONE (OUTPATIENT)
Dept: SURGERY | Facility: CLINIC | Age: 41
End: 2021-11-09

## 2021-11-09 ENCOUNTER — OFFICE VISIT (OUTPATIENT)
Dept: SURGERY | Facility: CLINIC | Age: 41
End: 2021-11-09
Payer: COMMERCIAL

## 2021-11-09 VITALS
SYSTOLIC BLOOD PRESSURE: 124 MMHG | DIASTOLIC BLOOD PRESSURE: 78 MMHG | HEIGHT: 73 IN | BODY MASS INDEX: 26.51 KG/M2 | WEIGHT: 200 LBS

## 2021-11-09 DIAGNOSIS — M54.2 NECK PAIN: Primary | ICD-10-CM

## 2021-11-09 PROCEDURE — 3008F BODY MASS INDEX DOCD: CPT | Performed by: NEUROLOGICAL SURGERY

## 2021-11-09 PROCEDURE — 99213 OFFICE O/P EST LOW 20 MIN: CPT | Performed by: NEUROLOGICAL SURGERY

## 2021-11-09 PROCEDURE — 3078F DIAST BP <80 MM HG: CPT | Performed by: NEUROLOGICAL SURGERY

## 2021-11-09 PROCEDURE — 3074F SYST BP LT 130 MM HG: CPT | Performed by: NEUROLOGICAL SURGERY

## 2021-11-09 RX ORDER — MELOXICAM 15 MG/1
15 TABLET ORAL DAILY
Qty: 30 TABLET | Refills: 2 | Status: SHIPPED | OUTPATIENT
Start: 2021-11-09 | End: 2022-01-17

## 2021-11-09 NOTE — PROGRESS NOTES
Neurosurgery Clinic Visit  2021    Alec Bear.  PCP:  DO CHAPARRITA Carrington 1980 MRN [id number]     HISTORY OF PRESENT ILLNESS:  Alec Bear. is a(n) 37year old male presents for follow-up regarding neck and back pain  He got time spent with patient:  20 minutes      Nitza Kenny MD   [address]  11/9/2021  2:43 PM   Dictated but not proofread

## 2021-11-09 NOTE — TELEPHONE ENCOUNTER
Pt informed that he needs an updated work letter with restrictions. Pt states the last letter is acceptable from 10/19/21, just needing letter updated. Informed that we will send completed letter to his [facility]. Pt accepting of this and call ended.      Letter i

## 2021-11-09 NOTE — PROGRESS NOTES
Injections seemed to work for about 2 days and then wore off    Has already done PT    Having pain going down both arms, neck and low back as well

## 2021-11-12 ENCOUNTER — TELEPHONE (OUTPATIENT)
Dept: SURGERY | Facility: CLINIC | Age: 41
End: 2021-11-12

## 2021-11-12 NOTE — TELEPHONE ENCOUNTER
Received Return to Work form from [employer] Padmini Munoz [employer] Work Ability/Return to Work.   Placed in [facility] folder in nurse in basket

## 2021-11-15 NOTE — TELEPHONE ENCOUNTER
Pt returned call from nursing:  Patient states he was returned to work with restrictions as of 10/19/21.   He would like to remain with current restrictions from Last note on 11/9/21  Pt will be evaluated again on 1/11/22, Dr. Ce Patel determine RTW status at t

## 2021-11-15 NOTE — TELEPHONE ENCOUNTER
S:  AT and [facility] Work Ability/RTW forms located at [facility] and copied.      B: Per Dr. Rick Louie at [facility] on 11/9/21:    \"ASSESSMENT and PLAN:  43-year-old gentleman with your back and neck pain  I don't have a good surgic

## 2021-11-19 NOTE — TELEPHONE ENCOUNTER
Received completed disability paperwork. Called patient and left message asking how he would like to receive it. No ALEXEY on file. [email or web address], [employer] message sent. Placed back in \"addressed\" bin at [employer].

## 2021-12-03 ENCOUNTER — OFFICE VISIT (OUTPATIENT)
Dept: PAIN CLINIC | Facility: CLINIC | Age: 41
End: 2021-12-03
Payer: COMMERCIAL

## 2021-12-03 VITALS
WEIGHT: 200 LBS | HEART RATE: 79 BPM | SYSTOLIC BLOOD PRESSURE: 120 MMHG | DIASTOLIC BLOOD PRESSURE: 80 MMHG | OXYGEN SATURATION: 99 % | BODY MASS INDEX: 26 KG/M2

## 2021-12-03 DIAGNOSIS — M51.36 DDD (DEGENERATIVE DISC DISEASE), LUMBAR: ICD-10-CM

## 2021-12-03 DIAGNOSIS — M50.30 DDD (DEGENERATIVE DISC DISEASE), CERVICAL: Primary | ICD-10-CM

## 2021-12-03 PROCEDURE — 3074F SYST BP LT 130 MM HG: CPT | Performed by: ANESTHESIOLOGY

## 2021-12-03 PROCEDURE — 99215 OFFICE O/P EST HI 40 MIN: CPT | Performed by: ANESTHESIOLOGY

## 2021-12-03 PROCEDURE — 3079F DIAST BP 80-89 MM HG: CPT | Performed by: ANESTHESIOLOGY

## 2021-12-03 NOTE — PROGRESS NOTES
Patient presents in office today with reported pain in neck and low back    Current pain level reported = 2-3/10    Last reported dose of meloxicam yesterday      Narcotic Contract renewal na    Urine Drug screen na

## 2021-12-03 NOTE — PROGRESS NOTES
Name: Kye Rojas. : 1980   DOS: 12/3/2021     Pain Clinic Follow Up Visit:   Patient presents with:   Follow - Up      Kye Rojas. is a 37year old male with a longstanding history of low back pain from a traumatic accident when he w normal.  Neck: Full range of motion noted  Back: Injection site is clear. Facet loading positive.   Skin: Warm, dry  Cranial nerves: Grossly intact      IMAGES:   Lumbar MRI reviewed with evidence of diffuse disc bulge at L5      ASSESSMENT AND PLAN:   DDD plan.  No follow-ups on file.     Kristen Dumont MD, 12/3/2021, 8:40 AM

## 2021-12-07 ENCOUNTER — TELEPHONE (OUTPATIENT)
Dept: NEUROLOGY | Facility: CLINIC | Age: 41
End: 2021-12-07

## 2021-12-07 DIAGNOSIS — M50.30 DISC DISEASE, DEGENERATIVE, CERVICAL: Primary | ICD-10-CM

## 2021-12-07 DIAGNOSIS — M51.36 DDD (DEGENERATIVE DISC DISEASE), LUMBAR: ICD-10-CM

## 2021-12-07 NOTE — TELEPHONE ENCOUNTER
Prior authorization request completed for: MARÍA ELENA L4 / L5  Authorization # [id number]  Authorization dates: 12/7/2021 - 1/5/2022  CPT codes approved: 77054 / 52484  Number of visits/dates of service approved: 1  Physician: Dr. Omkar Sheriff   Location: [facility] to schedule

## 2021-12-07 NOTE — TELEPHONE ENCOUNTER
Question Answer   Anesthesia Type Sedation   Provider [facility]   Procedure Medial Branch Block   Laterality/Level bilateral L4, L5   Medical clearance requested (will send to Pain Navigator) No   Patient has Medicare coverage?  No   Comments (Please list entire procedure name here.) bilateral lumbar medial branch blocks

## 2021-12-08 NOTE — TELEPHONE ENCOUNTER
Pt was being seen in office with Dr. Nalini Smith today, and we were able to get him scheduled for his procedure with Dr. Lelo Javier.

## 2021-12-13 ENCOUNTER — LAB ENCOUNTER (OUTPATIENT)
Dept: LAB | Facility: HOSPITAL | Age: 41
End: 2021-12-13
Attending: ANESTHESIOLOGY
Payer: COMMERCIAL

## 2021-12-13 DIAGNOSIS — M50.30 DISC DISEASE, DEGENERATIVE, CERVICAL: ICD-10-CM

## 2021-12-16 ENCOUNTER — APPOINTMENT (OUTPATIENT)
Dept: GENERAL RADIOLOGY | Facility: HOSPITAL | Age: 41
End: 2021-12-16
Attending: ANESTHESIOLOGY
Payer: COMMERCIAL

## 2021-12-16 ENCOUNTER — HOSPITAL ENCOUNTER (OUTPATIENT)
Facility: HOSPITAL | Age: 41
Setting detail: HOSPITAL OUTPATIENT SURGERY
Discharge: HOME | End: 2021-12-16
Attending: ANESTHESIOLOGY | Admitting: ANESTHESIOLOGY
Payer: COMMERCIAL

## 2021-12-16 VITALS
WEIGHT: 200 LBS | BODY MASS INDEX: 26.51 KG/M2 | HEIGHT: 73 IN | OXYGEN SATURATION: 100 % | HEART RATE: 76 BPM | SYSTOLIC BLOOD PRESSURE: 124 MMHG | TEMPERATURE: 98 F | RESPIRATION RATE: 20 BRPM | DIASTOLIC BLOOD PRESSURE: 86 MMHG

## 2021-12-16 DIAGNOSIS — M51.36 DDD (DEGENERATIVE DISC DISEASE), LUMBAR: ICD-10-CM

## 2021-12-16 DIAGNOSIS — M50.30 DISC DISEASE, DEGENERATIVE, CERVICAL: ICD-10-CM

## 2021-12-16 PROCEDURE — 99152 MOD SED SAME PHYS/QHP 5/>YRS: CPT | Performed by: ANESTHESIOLOGY

## 2021-12-16 PROCEDURE — 3E0T3BZ INTRODUCTION OF ANESTHETIC AGENT INTO PERIPHERAL NERVES AND PLEXI, PERCUTANEOUS APPROACH: ICD-10-PCS | Performed by: ANESTHESIOLOGY

## 2021-12-16 RX ORDER — ONDANSETRON 2 MG/ML
4 INJECTION INTRAMUSCULAR; INTRAVENOUS ONCE AS NEEDED
Status: DISCONTINUED | OUTPATIENT
Start: 2021-12-16 | End: 2021-12-16

## 2021-12-16 RX ORDER — DIPHENHYDRAMINE HYDROCHLORIDE 50 MG/ML
50 INJECTION INTRAMUSCULAR; INTRAVENOUS ONCE AS NEEDED
Status: CANCELLED | OUTPATIENT
Start: 2021-12-16 | End: 2021-12-16

## 2021-12-16 RX ORDER — MIDAZOLAM HYDROCHLORIDE 1 MG/ML
INJECTION INTRAMUSCULAR; INTRAVENOUS
Status: DISCONTINUED | OUTPATIENT
Start: 2021-12-16 | End: 2021-12-16

## 2021-12-16 RX ORDER — SODIUM CHLORIDE, SODIUM LACTATE, POTASSIUM CHLORIDE, CALCIUM CHLORIDE 600; 310; 30; 20 MG/100ML; MG/100ML; MG/100ML; MG/100ML
100 INJECTION, SOLUTION INTRAVENOUS CONTINUOUS
Status: DISCONTINUED | OUTPATIENT
Start: 2021-12-16 | End: 2021-12-16

## 2021-12-16 RX ORDER — ONDANSETRON 2 MG/ML
4 INJECTION INTRAMUSCULAR; INTRAVENOUS ONCE AS NEEDED
Status: CANCELLED | OUTPATIENT
Start: 2021-12-16 | End: 2021-12-16

## 2021-12-16 RX ORDER — BUPIVACAINE HYDROCHLORIDE 5 MG/ML
INJECTION, SOLUTION EPIDURAL; INTRACAUDAL
Status: DISCONTINUED | OUTPATIENT
Start: 2021-12-16 | End: 2021-12-16

## 2021-12-16 RX ORDER — METHYLPREDNISOLONE ACETATE 40 MG/ML
INJECTION, SUSPENSION INTRA-ARTICULAR; INTRALESIONAL; INTRAMUSCULAR; SOFT TISSUE
Status: DISCONTINUED | OUTPATIENT
Start: 2021-12-16 | End: 2021-12-16

## 2021-12-16 RX ORDER — LIDOCAINE HYDROCHLORIDE 10 MG/ML
INJECTION, SOLUTION EPIDURAL; INFILTRATION; INTRACAUDAL; PERINEURAL
Status: DISCONTINUED | OUTPATIENT
Start: 2021-12-16 | End: 2021-12-16

## 2021-12-16 NOTE — H&P
History & Physical Examination    Patient Name: Landon Clemens   MRN: [id number]  CSN: [id number]  YOB: 1980    Pre-Operative Diagnosis:  Disc disease, degenerative, cervical [M50.30]  DDD (degenerative disc disease), lumbar [M51.36]    Pre

## 2021-12-16 NOTE — OPERATIVE REPORT
[facility]  Operative Report  2021     Dary Crawford.  Patient Status:  Hospital Outpatient Surgery    1980 MRN [id number]   Location [address] [place] [address] Attending Amanda Sellers MD   Hosp Day # 0 PCP Ulises Cuba L4, L5 transverse process and lateral to the L4, L5 superior articular process, and it was directed inferiorly and medially so that the tip struck the superior aspect of the junction of the transverse process and the supe¬rior articular process.   Following

## 2021-12-30 ENCOUNTER — TELEPHONE (OUTPATIENT)
Dept: PAIN CLINIC | Facility: CLINIC | Age: 41
End: 2021-12-30

## 2021-12-30 NOTE — TELEPHONE ENCOUNTER
Pt reached out to the answering service to cancel today's appointment due to COVID illness. Attempted to reach patient to switch to video visit, but phone number is in invalid. Will reach out to patient via [address]

## 2022-01-17 ENCOUNTER — OFFICE VISIT (OUTPATIENT)
Dept: PAIN CLINIC | Facility: CLINIC | Age: 42
End: 2022-01-17
Payer: COMMERCIAL

## 2022-01-17 VITALS
DIASTOLIC BLOOD PRESSURE: 80 MMHG | BODY MASS INDEX: 26 KG/M2 | OXYGEN SATURATION: 100 % | HEART RATE: 80 BPM | WEIGHT: 200 LBS | SYSTOLIC BLOOD PRESSURE: 110 MMHG

## 2022-01-17 DIAGNOSIS — M51.36 DDD (DEGENERATIVE DISC DISEASE), LUMBAR: Primary | ICD-10-CM

## 2022-01-17 PROCEDURE — 99214 OFFICE O/P EST MOD 30 MIN: CPT | Performed by: ANESTHESIOLOGY

## 2022-01-17 PROCEDURE — 3079F DIAST BP 80-89 MM HG: CPT | Performed by: ANESTHESIOLOGY

## 2022-01-17 PROCEDURE — 3074F SYST BP LT 130 MM HG: CPT | Performed by: ANESTHESIOLOGY

## 2022-01-17 NOTE — PROGRESS NOTES
Name: King Nikita. : 1980   DOS: 2022     Pain Clinic Follow Up Visit:   Patient presents with:  Procedure Follow Up      King Nikita. is a 37year old male.   History of chronic low back pain here for follow-up after lumbar media file.    Linsey Motta MD, 1/17/2022, 9:36 AM

## 2022-01-17 NOTE — PROGRESS NOTES
Last procedure: LUMBAR  MEDIAL BRANCH BLOCK BILATERAL L4, L5 with sedation  Date: 12/16/21  Percentage of relief obtained: 95%  Duration of relief: current     Current Pain Score: 1/10

## 2022-08-02 ENCOUNTER — TELEPHONE (OUTPATIENT)
Dept: PAIN CLINIC | Facility: CLINIC | Age: 42
End: 2022-08-02

## 2022-08-02 NOTE — TELEPHONE ENCOUNTER
Contacted pt to discuss his pain. Pt states that he had injection last year and it did really help his pain but the last 1 1/2 week ago he started having pain again from his lower back. Pt states that over the weekend he was moving chair and pain got worse. .Rated his pain 3-4 when sitting and not moving but it goes to 8 out of 10 upon movement. Described the pain as achy when sitting and sharp when moving around. Advised pt to set up an [address] visit for evaluation with Meena Noguera. Pt agreed and scheduled 8/3/2022. Pt requested if Lilia prescribe him some pain medication at this time,advised pt to take some Tylenol/Ibuprofen,ice and heat compress. Pt states he took some Ibuprofen but it did not help his pain. Advised pt that will reach out to Meena Noguera for his feedback. Demetri CHO

## 2022-08-02 NOTE — TELEPHONE ENCOUNTER
Pt is calling requesting that a nurse contact him. He states he would like to discuss his pain. Please advise.

## 2022-08-03 ENCOUNTER — TELEPHONE (OUTPATIENT)
Dept: NEUROLOGY | Facility: CLINIC | Age: 42
End: 2022-08-03

## 2022-08-03 ENCOUNTER — TELEPHONE (OUTPATIENT)
Dept: PAIN CLINIC | Facility: CLINIC | Age: 42
End: 2022-08-03

## 2022-08-03 ENCOUNTER — OFFICE VISIT (OUTPATIENT)
Dept: PAIN CLINIC | Facility: CLINIC | Age: 42
End: 2022-08-03
Payer: COMMERCIAL

## 2022-08-03 VITALS — DIASTOLIC BLOOD PRESSURE: 80 MMHG | HEART RATE: 69 BPM | SYSTOLIC BLOOD PRESSURE: 120 MMHG | OXYGEN SATURATION: 98 %

## 2022-08-03 DIAGNOSIS — M47.816 LUMBAR SPONDYLOSIS: ICD-10-CM

## 2022-08-03 DIAGNOSIS — M51.36 DDD (DEGENERATIVE DISC DISEASE), LUMBAR: Primary | ICD-10-CM

## 2022-08-03 PROCEDURE — 3079F DIAST BP 80-89 MM HG: CPT | Performed by: PHYSICIAN ASSISTANT

## 2022-08-03 PROCEDURE — 3074F SYST BP LT 130 MM HG: CPT | Performed by: PHYSICIAN ASSISTANT

## 2022-08-03 PROCEDURE — 99214 OFFICE O/P EST MOD 30 MIN: CPT | Performed by: PHYSICIAN ASSISTANT

## 2022-08-03 NOTE — PROGRESS NOTES
Patient presents in office today with reported pain in low back     Current pain level reported = 5/10    Last reported dose of ibuprofen last night     Narcotic Contract renewal na    Urine Drug screen na

## 2022-08-03 NOTE — TELEPHONE ENCOUNTER
Good morning Dr. Doss Sites, I just saw Amena Brooks for an extended follow up, and we are planning on repeating some medial branch nerve blocks, which had previously provided significant relief for him. That said, he is still suffering from some fairly significant anxiety regarding his LBP, and told him I would reach out to you and asked that we contact patient to resume some sessions. He was very agreeable to this. Let me know if there is anything I can do to help facilitate.

## 2022-08-03 NOTE — TELEPHONE ENCOUNTER
Prior authorization request completed for: Bilateral L4, L5 MBB    Authorization # [id number]  Authorization dates: 8/3/2022 - 9/1/2022   CPT codes approved: 04226 / 24404   Number of visits/dates of service approved: 1  Physician: Dr. Juan German   Location: [facility]     Patient can be scheduled. Routed to Navigator.

## 2022-08-24 NOTE — LETTER
[facility] [address], [address] [facility]    Consent for Anesthesia   1.    Sulaiman Cha agree to be cared for by a physician anesthesiologist alone and/or with a nurse anesthetist, who is specially trained to monitor me and give allergic reactions to medications, injury to my airway, heart, lungs, vision, nerves, or muscles and in extremely rare instances death. 5. My doctor has explained to me other choices available to me for my care (alternatives).   6. Pregnant Patients (“epid Printed: 8/11/2021 at 12:05 PM    Medical Record #: [id number]                                            Page 1 of 1 equal bilaterally equal bilaterally

## 2022-09-16 ENCOUNTER — OFFICE VISIT (OUTPATIENT)
Dept: FAMILY MEDICINE CLINIC | Facility: CLINIC | Age: 42
End: 2022-09-16
Payer: COMMERCIAL

## 2022-09-16 VITALS
WEIGHT: 199 LBS | HEIGHT: 73 IN | BODY MASS INDEX: 26.37 KG/M2 | SYSTOLIC BLOOD PRESSURE: 116 MMHG | HEART RATE: 64 BPM | DIASTOLIC BLOOD PRESSURE: 80 MMHG | TEMPERATURE: 98 F | RESPIRATION RATE: 18 BRPM

## 2022-09-16 DIAGNOSIS — M54.42 ACUTE BILATERAL LOW BACK PAIN WITH LEFT-SIDED SCIATICA: Primary | ICD-10-CM

## 2022-09-16 PROCEDURE — 99214 OFFICE O/P EST MOD 30 MIN: CPT | Performed by: FAMILY MEDICINE

## 2022-09-16 PROCEDURE — 3008F BODY MASS INDEX DOCD: CPT | Performed by: FAMILY MEDICINE

## 2022-09-16 PROCEDURE — 3079F DIAST BP 80-89 MM HG: CPT | Performed by: FAMILY MEDICINE

## 2022-09-16 PROCEDURE — 3074F SYST BP LT 130 MM HG: CPT | Performed by: FAMILY MEDICINE

## 2022-09-16 RX ORDER — CYCLOBENZAPRINE HCL 10 MG
10 TABLET ORAL NIGHTLY PRN
Qty: 30 TABLET | Refills: 0 | Status: SHIPPED | OUTPATIENT
Start: 2022-09-16

## 2022-09-16 RX ORDER — PREDNISONE 20 MG/1
TABLET ORAL
Qty: 20 TABLET | Refills: 0 | Status: SHIPPED | OUTPATIENT
Start: 2022-09-16

## 2023-10-24 ENCOUNTER — OFFICE VISIT (OUTPATIENT)
Dept: PAIN CLINIC | Facility: CLINIC | Age: 43
End: 2023-10-24

## 2023-10-24 VITALS — OXYGEN SATURATION: 97 % | SYSTOLIC BLOOD PRESSURE: 122 MMHG | DIASTOLIC BLOOD PRESSURE: 72 MMHG | HEART RATE: 77 BPM

## 2023-10-24 DIAGNOSIS — M47.816 LUMBAR SPONDYLOSIS: Primary | ICD-10-CM

## 2023-10-24 PROCEDURE — 3078F DIAST BP <80 MM HG: CPT | Performed by: PHYSICIAN ASSISTANT

## 2023-10-24 PROCEDURE — 99214 OFFICE O/P EST MOD 30 MIN: CPT | Performed by: PHYSICIAN ASSISTANT

## 2023-10-24 PROCEDURE — 3074F SYST BP LT 130 MM HG: CPT | Performed by: PHYSICIAN ASSISTANT

## 2023-10-24 RX ORDER — CYCLOBENZAPRINE HCL 10 MG
10 TABLET ORAL NIGHTLY PRN
Qty: 30 TABLET | Refills: 0 | Status: SHIPPED | OUTPATIENT
Start: 2023-10-24

## 2023-10-24 RX ORDER — PREDNISONE 20 MG/1
20 TABLET ORAL DAILY
COMMUNITY

## 2023-10-24 RX ORDER — CYCLOBENZAPRINE HCL 10 MG
10 TABLET ORAL 3 TIMES DAILY PRN
COMMUNITY

## 2023-10-24 NOTE — PROGRESS NOTES
Patient presents in office today with reported pain in low back bilateral radiating into right upper leg    Current pain level reported = 5/10    Last reported dose of n/a      Narcotic Contract renewal n/a    Urine Drug screen n/a

## 2024-08-05 ENCOUNTER — OFFICE VISIT (OUTPATIENT)
Dept: PAIN CLINIC | Facility: CLINIC | Age: 44
End: 2024-08-05
Payer: COMMERCIAL

## 2024-08-05 ENCOUNTER — TELEPHONE (OUTPATIENT)
Dept: NEUROLOGY | Facility: CLINIC | Age: 44
End: 2024-08-05

## 2024-08-05 VITALS — WEIGHT: 198 LBS | BODY MASS INDEX: 26 KG/M2

## 2024-08-05 DIAGNOSIS — F41.9 ANXIETY: ICD-10-CM

## 2024-08-05 DIAGNOSIS — M47.816 LUMBAR SPONDYLOSIS: Primary | ICD-10-CM

## 2024-08-05 PROCEDURE — 99214 OFFICE O/P EST MOD 30 MIN: CPT | Performed by: PHYSICIAN ASSISTANT

## 2024-08-05 RX ORDER — MELOXICAM 7.5 MG/1
7.5 TABLET ORAL DAILY
Qty: 15 TABLET | Refills: 1 | Status: SHIPPED | OUTPATIENT
Start: 2024-08-05

## 2024-08-05 NOTE — TELEPHONE ENCOUNTER
I tried placing order for [facility] and nothing came up.  Any ideas on how to get this pt a referral for treatment for anxiety?

## 2024-08-05 NOTE — PROGRESS NOTES
HPI:   Harjinder Schmitz Jr. presents with complaints of Low back pain.    The pain is described as moderate aching that is intermittent.  The patient’s activity level has remained the same since last visit.  The pain is worst unrelated to time of day.    Changes in condition/history since last visit: here today for follow up, having been seen last on 10/24/23.  At that time, had request refill of flexeril in anticipation of flight over seas ([place]  Prior to this, had reported 80% relief with bilateral L4-5 and L5-S1 medial branch block on 12/16/2021, and for 7 to 8 months, was very pleased with his response.  Pain had returned, and we had discussed repeating procedure.  Unfortunately, citing anxiety about the procedure, decided not to proceed.  States that he has been active with PT, though this was largely ineffective.  Active with HEP, without relief.  Continues with significant anxiety, and is requesting referral to behavioral health.      Last lumbar procedure: B MBNB L4, L5    Date: 12/16/21    Percentage of relief experienced from the procedure: 80%    Duration of the relief: 7-8 months    Previous lumbar procedure:  LESI Date:  11/4/21    % relief:  Limited     Duration:  n/a    Previous cervical procedure:  PRADEEP Date:  10/28/21    % relief:  Limited     Duration:  n/a    The following activities will increase the patient’s pain: walking, standing    The following activities decrease the patient’s pain: limiting activity level    Functional Assessment: Patient reports that they are able to complete all of their ADL's such as eating, bathing, using the toilet, dressing and getting up from a bed or a chair independently.    Current Medications:  Current Outpatient Medications   Medication Sig Dispense Refill    cyclobenzaprine 10 MG Oral Tab Take 1 tablet (10 mg total) by mouth nightly as needed for Muscle spasms. 30 tablet 0      Patient requires assistance with: No assistance required    Reviewed Patient  History Dated: 10/24/23 no changes noted    Physical Exam:   There were no vitals taken for this visit.  VAS Pain Score:  6/10  General Appearance: Well developed, well nourished, normal build, independent body habitus, no apparent physical disabilities, well groomed    Neurological Exam: WNL-Orientation to time, place and person, normal mood & effect, normal concentration & attention span  Inspection: non-antalgic, no acute distress  Pain with lumbar extension  Pain to palpation inferior lumbar facets and paraspinal musculature   Radiology/Lab Test Reviewed: MRI L spine:  CONCLUSION:         Broad-based disc bulge effaces bilateral lateral recesses at L5-S1 and may minimally abut the S1 nerve roots.  This is unchanged when compared to prior examination.       There is no significant spinal canal or neural foraminal stenosis.       Left foraminal disc herniation at L3-L4 is noted.  This is not causing significant neural foraminal stenosis      Lab Results   Component Value Date    WBC 5.7 01/09/2015    WBC 7.1 10/03/2014   No results found for: \"HEMOGLOBIN\"  Lab Results   Component Value Date    .0 01/09/2015    .0 10/03/2014       Do you have any known blood/bleeding disorders?  No  Does patient currently take blood thinners?   None  Does patient currently take any antibiotics?   No  Patient educated and verbalized understanding.  Medical Decision Making:   Diagnosis:    Encounter Diagnoses   Name Primary?    Lumbar spondylosis Yes    Anxiety      Impression: Excellent relief with previous bilateral L4 and L5 medial branch blocks, reporting 80% relief for approximately 8 months.  Had discussed repeating injections, though citing anxiety about procedure, decided against this.  He has continued to struggle with anxiety, and did place order for [facility].  Can try course of meloxicam.      Plan: Patient to follow up PRN.  Short course of meloxicam.  Referred to behavioral health for anxiety.      No  orders of the defined types were placed in this encounter.      Meds & Refills for this Visit:  Requested Prescriptions      No prescriptions requested or ordered in this encounter       Imaging & Consults:  None    The patient indicates understanding of these issues and agrees to the plan.    DARRIUS Patel

## 2024-08-05 NOTE — PROGRESS NOTES
Patient presents in office today with reported pain in low back pain    Current pain level reported = 6/10    Last reported dose of nothing for about a month      Narcotic Contract renewal none    Urine Drug screen none

## 2024-08-05 NOTE — PATIENT INSTRUCTIONS
Refill policies:    Allow 2-3 business days for refills; controlled substances may take longer.  Contact your pharmacy at least 5 days prior to running out of medication and have them send an electronic request or submit request through the “request refill” option in your [employer] account.  Refills are not addressed on weekends; covering physicians do not authorize routine medications on weekends.  No narcotics or controlled substances are refilled after noon on Fridays or by on call physicians.  By law, narcotics must be electronically prescribed.  A 30 day supply with no refills is the maximum allowed.  If your prescription is due for a refill, you may be due for a follow up appointment.  To best provide you care, patients receiving routine medications need to be seen at least once a year.  Patients receiving narcotic/controlled substance medications need to be seen at least once every 3 months.  In the event that your preferred pharmacy does not have the requested medication in stock (e.g. Backordered), it is your responsibility to find another pharmacy that has the requested medication available.  We will gladly send a new prescription to that pharmacy at your request.    Scheduling Tests:    If your physician has ordered radiology tests such as MRI or CT scans, please contact Central Scheduling at [phone] right away to schedule the test.  Once scheduled, the [facility] Centralized Referral Team will work with your insurance carrier to obtain pre-certification or prior authorization.  Depending on your insurance carrier, approval may take 3-10 days.  It is highly recommended patients assure they have received an authorization before having a test performed.  If test is done without insurance authorization, patient may be responsible for the entire amount billed.      Precertification and Prior Authorizations:  If your physician has recommended that you have a procedure or additional testing performed the [facility]  Centralized Referral Team will contact your insurance carrier to obtain pre-certification or prior authorization.    You are strongly encouraged to contact your insurance carrier to verify that your procedure/test has been approved and is a COVERED benefit.  Although the [facility] Centralized Referral Team does its due diligence, the insurance carrier gives the disclaimer that \"Although the procedure is authorized, this does not guarantee payment.\"    Ultimately the patient is responsible for payment.   Thank you for your understanding in this matter.  Paperwork Completion:  If you require FMLA or disability paperwork for your recovery, please make sure to either drop it off or have it faxed to our office at [phone]. Be sure the form has your name and date of birth on it.  The form will be faxed to our Forms Department and they will complete it for you.  There is a 25$ fee for all forms that need to be filled out.  Please be aware there is a 10-14 day turnaround time.  You will need to sign a release of information (ALEXEY) form if your paperwork does not come with one.  You may call the Forms Department with any questions at [phone].  Their fax number is [phone].

## 2024-09-25 ENCOUNTER — TELEPHONE (OUTPATIENT)
Dept: PAIN CLINIC | Facility: CLINIC | Age: 44
End: 2024-09-25

## 2024-09-25 NOTE — TELEPHONE ENCOUNTER
Per Apurva's request, LVM for patient to schedule office visit.  Please schedule with Conor if patient calls back.

## 2024-10-22 ENCOUNTER — TELEPHONE (OUTPATIENT)
Dept: PAIN CLINIC | Facility: CLINIC | Age: 44
End: 2024-10-22

## 2024-10-22 RX ORDER — CYCLOBENZAPRINE HCL 10 MG
10 TABLET ORAL 2 TIMES DAILY PRN
Qty: 30 TABLET | Refills: 0 | Status: SHIPPED | OUTPATIENT
Start: 2024-10-22

## 2024-10-22 NOTE — TELEPHONE ENCOUNTER
Patient is calling stating that he has been experiencing a weird pain in his left foot, sometimes in the thigh area as well. Patient is stating he would ball his foot up like fist and it will get stuck for half a second. He stated this morning when he woke up it hurts to straighten his foot and is at work with his shoes off because of the pain. Tried to schedule the patient an in office visit but he unable to attend the soonest appointment tomorrow due to work and will be out of town this Thursday 10/24.

## 2024-10-22 NOTE — TELEPHONE ENCOUNTER
Script for cyclobenzaprine sent to pt's pharmacy.I contacted the PT and notified him about the script. Per pt, this is the first time he felt this pain on his left foot; per pt, he was not able to wear his shoe due to feeling tight and pain. Pt denies any injury. Advised pt that it seems like he's having cramping pain according to his description of his symptoms; per pt, this info makes him feel better already. Per PT, he has to go out of town for work and would need a work note. Advised pt that we will not be able to provide a note without an office visit. Pt, requesting to be scheduled today, advised Pt that Conor did not have an opening, but there was an opening tomorrow. Pt requested to schedule to see Conor 10/23; per Pt, he will take the medication, and if it feels better with it, he will cancel his appointment. Pt asked if he has to go to the ER as his foot feels really cold. Advised pt if his pain is getting worse and the medication does not help, he can go to the ED if needed. No further questions at this time.

## 2024-10-22 NOTE — TELEPHONE ENCOUNTER
Received a call from PT; per PT, he has still left over cyclobenzaprine at home per his wife. Advised pt that then he do not have to  the medication from the pharmacy. Pt [place] and no further questions at this time.

## 2024-10-23 ENCOUNTER — OFFICE VISIT (OUTPATIENT)
Dept: FAMILY MEDICINE CLINIC | Facility: CLINIC | Age: 44
End: 2024-10-23
Payer: COMMERCIAL

## 2024-10-23 DIAGNOSIS — M79.672 LEFT FOOT PAIN: Primary | ICD-10-CM

## 2024-10-23 PROCEDURE — 99213 OFFICE O/P EST LOW 20 MIN: CPT | Performed by: FAMILY MEDICINE

## 2024-10-23 RX ORDER — PREDNISONE 20 MG/1
TABLET ORAL
Qty: 10 TABLET | Refills: 0 | Status: SHIPPED | OUTPATIENT
Start: 2024-10-23

## 2024-10-24 NOTE — PROGRESS NOTES
Chief Complaint   Patient presents with    Foot Pain     Started 3-4 days ago. Woke with pain. Can't bear weight along the ball of the foot. Feels like a bulge under the foot when he walks.         HPI:   Left foot has been hurting for 3-4 days . Tight/cramping in character. Radiation- none. Feels swollen under the ball of the foot. Feels like a lump when he walks  No weakness.  No numbness or tingling. Pt reports relief if he squeezes his foot with his hand while flexing his toes. No known injury. Has hx of lumbar DDD with left sided sx. Has not had similar foot sx in the past.  Pt leaving for vacation tomorrow for 5 days.     Current Outpatient Medications   Medication Sig Dispense Refill    predniSONE 20 MG Oral Tab TAKE 2 TABS BY MOUTH DAILY FOR 5 DAYS 10 tablet 0    cyclobenzaprine 10 MG Oral Tab Take 1 tablet (10 mg total) by mouth 2 (two) times daily as needed for Muscle spasms. 30 tablet 0    Meloxicam 7.5 MG Oral Tab Take 1 tablet (7.5 mg total) by mouth daily. 15 tablet 1      Past Medical History:    Back pain    Back problem    Esophageal reflux    Stroke (HCC)    TIA      No past surgical history on file.   Social History:   Social History     Socioeconomic History    Marital status:    Tobacco Use    Smoking status: Never    Smokeless tobacco: Never   Vaping Use    Vaping status: Never Used   Substance and Sexual Activity    Alcohol use: Yes     Comment: occasionally    Drug use: Yes     Types: Cannabis     Comment: marijuana   Other Topics Concern    Caffeine Concern No    Exercise No     Social Drivers of Health     Financial Resource Strain: Low Risk  (8/6/2024)    Financial Resource Strain     Difficulty of Paying Living Expenses: Not hard at all     Med Affordability: No   Food Insecurity: No Food Insecurity (8/6/2024)    Food Insecurity     Food Insecurity: Never true   Transportation Needs: No Transportation Needs (8/6/2024)    Transportation Needs     Lack of Transportation: No    Physical Activity: Inactive (8/6/2024)    Exercise Vital Sign     Days of Exercise per Week: 0 days     Minutes of Exercise per Session: 0 min   Stress: Stress Concern Present (8/6/2024)    Stress     Feeling of Stress : Yes   Social Connections: Socially Integrated (8/6/2024)    Social Connections     Frequency of Socialization with Friends and Family: 3   Housing Stability: Low Risk  (8/6/2024)    Housing Stability     Housing Instability: No               ROS:  GEN: no fever, no chills, no fatigue  CHEST: no chest pains.  SKIN: no rashes  HEM: no ecchymoses  JOINTS: no other joints pain.  NEURO: no tingling, no weakness, no abnormal sensation.      There were no vitals taken for this visit.    PE:  Gen:  WD/WN NAD  HEENT:  YENY, EOM-i.  LUNGS:CTA cam.  HEART:S1/S2 reg., no murmurs, clicks, gallops  SKIN:no rashes on the chest or back.  Back:  Normal on inspection, no pain on palpation of the spinal and paraspinal muscles.   Neuro:  Reflexes at knees 2+ and symmetric  Musculoskeletal: left foot without gross deformity, erythema or noted swelling. There is pain to the 1st and 2nd metatarsal heads, primarily between the metatarsal heads.  There is increased pain with lateral compression of the metatarsal heads.     A/P  Encounter Diagnosis   Name Primary?    Left foot pain Yes       No orders of the defined types were placed in this encounter.      Meds & Refills for this Visit:  Requested Prescriptions     Signed Prescriptions Disp Refills    predniSONE 20 MG Oral Tab 10 tablet 0     Sig: TAKE 2 TABS BY MOUTH DAILY FOR 5 DAYS       Imaging & Consults:  None        Patient Instructions   Take prednisone-- 2 tabs once daily for 5 days. Take with food.   While you are on steroids it is preferred that you take Tylenol for pain if needed rather than ibuprofen (Motrin/Ibuprofen) or Aleve.  Work on stretching and gentle movement to reduce tightness in the foot.   Follow-up with PCP for further evaluation when you  return from vacation.     The patient understands and agrees to the plan.

## 2024-10-24 NOTE — PATIENT INSTRUCTIONS
Take prednisone-- 2 tabs once daily for 5 days. Take with food.   While you are on steroids it is preferred that you take Tylenol for pain if needed rather than ibuprofen (Motrin/Ibuprofen) or Aleve.  Work on stretching and gentle movement to reduce tightness in the foot.   Follow-up with PCP for further evaluation when you return from vacation.

## 2024-11-11 ENCOUNTER — TELEPHONE (OUTPATIENT)
Dept: FAMILY MEDICINE CLINIC | Facility: CLINIC | Age: 44
End: 2024-11-11

## 2024-11-11 ENCOUNTER — OFFICE VISIT (OUTPATIENT)
Dept: FAMILY MEDICINE CLINIC | Facility: CLINIC | Age: 44
End: 2024-11-11
Payer: COMMERCIAL

## 2024-11-11 VITALS
RESPIRATION RATE: 18 BRPM | HEIGHT: 74 IN | BODY MASS INDEX: 27.64 KG/M2 | HEART RATE: 82 BPM | TEMPERATURE: 97 F | WEIGHT: 215.38 LBS | SYSTOLIC BLOOD PRESSURE: 114 MMHG | DIASTOLIC BLOOD PRESSURE: 80 MMHG

## 2024-11-11 DIAGNOSIS — M79.672 LEFT FOOT PAIN: ICD-10-CM

## 2024-11-11 DIAGNOSIS — M54.16 CHRONIC LUMBAR RADICULOPATHY: ICD-10-CM

## 2024-11-11 DIAGNOSIS — Z00.00 ROUTINE GENERAL MEDICAL EXAMINATION AT A HEALTH CARE FACILITY: Primary | ICD-10-CM

## 2024-11-11 NOTE — PROGRESS NOTES
Harjinder Schmitz Jr. is a 44 year old male who presents for a complete physical exam.   HPI:   Pt complains of persistent low back pain and improving left foot pain.  Patient was seen at immediate care recently and diagnosed with left foot pain.  Original thought was he had gout but there was no pain to the touch.  He was given a course of prednisone 40 mg daily for 5 days and states that as of today, pain has improved significantly.  He did not have any injuries to explain the pain.  He also continues to have persistent low back pain with radiculopathy.  He has not had any recent imaging or injections.    Patient denies any family history of prostate or colon cancer.  Denies any other recent illnesses or hospitalizations.  Denies any family history of diabetes.    Wt Readings from Last 6 Encounters:   11/11/24 215 lb 6.4 oz (97.7 kg)   08/05/24 198 lb (89.8 kg)   09/16/22 199 lb (90.3 kg)   01/17/22 200 lb (90.7 kg)   12/15/21 200 lb (90.7 kg)   12/03/21 200 lb (90.7 kg)     Body mass index is 27.66 kg/m².     Results for orders placed or performed in visit on 12/13/21   Pain Pre-Procedure Covid-19 Testing    Collection Time: 12/13/21  9:07 AM    Specimen: Nares; Other   Result Value Ref Range    SARS-CoV-2 (Alinity) Not Detected Not Detected         Current Outpatient Medications   Medication Sig Dispense Refill    predniSONE 20 MG Oral Tab TAKE 2 TABS BY MOUTH DAILY FOR 5 DAYS 10 tablet 0    cyclobenzaprine 10 MG Oral Tab Take 1 tablet (10 mg total) by mouth 2 (two) times daily as needed for Muscle spasms. (Patient not taking: Reported on 11/11/2024) 30 tablet 0    Meloxicam 7.5 MG Oral Tab Take 1 tablet (7.5 mg total) by mouth daily. (Patient not taking: Reported on 11/11/2024) 15 tablet 1      Allergies[1]   Past Medical History:    Back pain    Back problem    Esophageal reflux    Stroke (HCC)    TIA      No past surgical history on file.   Family History   Problem Relation Age of Onset    Other (Other) Father          Alcoholic    Other (Other) Sister         lupus      Social History:  Social History     Socioeconomic History    Marital status:    Tobacco Use    Smoking status: Never    Smokeless tobacco: Never   Vaping Use    Vaping status: Never Used   Substance and Sexual Activity    Alcohol use: Yes     Comment: occasionally    Drug use: Yes     Types: Cannabis     Comment: marijuana   Other Topics Concern    Caffeine Concern No    Exercise No     Social Drivers of Health     Financial Resource Strain: Low Risk  (8/6/2024)    Financial Resource Strain     Difficulty of Paying Living Expenses: Not hard at all     Med Affordability: No   Food Insecurity: No Food Insecurity (8/6/2024)    Food Insecurity     Food Insecurity: Never true   Transportation Needs: No Transportation Needs (8/6/2024)    Transportation Needs     Lack of Transportation: No   Physical Activity: Inactive (8/6/2024)    Exercise Vital Sign     Days of Exercise per Week: 0 days     Minutes of Exercise per Session: 0 min   Stress: Stress Concern Present (8/6/2024)    Stress     Feeling of Stress : Yes   Social Connections: Socially Integrated (8/6/2024)    Social Connections     Frequency of Socialization with Friends and Family: 3   Housing Stability: Low Risk  (8/6/2024)    Housing Stability     Housing Instability: No      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 : yes. Children: three.   Exercise:  twice per week.  Diet: watches minimally     REVIEW OF SYSTEMS:   GENERAL: feels well otherwise  SKIN: denies any unusual skin lesions  EYES:denies blurred vision or double vision  HEENT: denies nasal congestion, sinus pain or ST  LUNGS: denies shortness of breath with exertion, denies cough  CARDIOVASCULAR: denies chest pain on exertion or at rest, denies palpitations  GI: denies abdominal pain,denies heartburn, denies n/v/d/c/blood in stool  : denies nocturia or changes in stream  MUSCULOSKELETAL: + chronic low back pain  NEURO: denies headaches, denies  LH/dizziness/syncope  PSYCHE: + depression and anxiety  HEMATOLOGIC: denies hx of anemia  ENDOCRINE: denies thyroid history  ALL/ASTHMA: denies hx of allergy or asthma    EXAM:   /80   Pulse 82   Temp 97 °F (36.1 °C) (Temporal)   Resp 18   Ht 6' 2\" (1.88 m)   Wt 215 lb 6.4 oz (97.7 kg)   BMI 27.66 kg/m²   Body mass index is 27.66 kg/m².   GENERAL: well developed, well nourished,in no apparent distress  SKIN: no rashes,no suspicious lesions  HEENT: atraumatic, normocephalic,ears and throat are clear  EYES:PERRLA, EOMI, conjunctiva are clear  NECK: supple,no adenopathy, no thyromegaly  LUNGS: clear to auscultation, no r/r/w  CARDIO: RRR without murmur  GI: good BS's,no masses, HSM or tenderness  :  two descended testes,no masses,no hernia,no penile lesions  RECTAL: good rectal tone, prostate shows no masses  MUSCULOSKELETAL: back is not tender,FROM of the back  EXTREMITIES: no cyanosis, clubbing or edema  NEURO: Oriented times three,cranial nerves are intact,motor and sensory are grossly intact; 2 + knee reflexes bilaterally  VASCULAR: 2 + dorsalis pedal pulses bilaterally    ASSESSMENT AND PLAN:   Harjinder Schmitz Jr. is a 44 year old male who presents for a complete physical exam.    Pt's weight is Body mass index is 27.66 kg/m²., recommended low fat diet and aerobic exercise 30 minutes three times weekly.   Health maintenance, will check:   Orders Placed This Encounter   Procedures    Comp Metabolic Panel (14) [E]    CBC W Differential W Platelet [E]    Lipid Panel [E]    TSH W Reflex To Free T4 [E]    PSA, Total (Screening) [E]    Urinalysis, Routine [E]    Uric Acid [905][Q]       Anxiety and depression - recommend cymbalta due to associated lumbar radiculopathy but patient declined today    Left foot pain - could be related to lumbar radiculopathy vs. Gout - check uric acid level and refer to physiatry    Advised to have ultrafast, and 5 minute heart aware.  Advised patient to check with insurance  company for coverage prior to doing the test.     The patient indicates understanding of these issues and agrees to the plan.    The patient is asked to return in 1 year pending lab results or sooner if mood worsens.        [1]   Allergies  Allergen Reactions    Shrimp NAUSEA AND VOMITING

## 2024-11-11 NOTE — TELEPHONE ENCOUNTER
Reached out to patient in regards to his appointment left message for patient to call clinic back.

## 2025-05-07 ENCOUNTER — OFFICE VISIT (OUTPATIENT)
Dept: PAIN CLINIC | Facility: CLINIC | Age: 45
End: 2025-05-07
Payer: COMMERCIAL

## 2025-05-07 ENCOUNTER — TELEPHONE (OUTPATIENT)
Dept: PAIN CLINIC | Facility: CLINIC | Age: 45
End: 2025-05-07

## 2025-05-07 VITALS — OXYGEN SATURATION: 91 % | DIASTOLIC BLOOD PRESSURE: 78 MMHG | HEART RATE: 98 BPM | SYSTOLIC BLOOD PRESSURE: 124 MMHG

## 2025-05-07 DIAGNOSIS — M47.816 LUMBAR SPONDYLOSIS: Primary | ICD-10-CM

## 2025-05-07 PROCEDURE — 99214 OFFICE O/P EST MOD 30 MIN: CPT | Performed by: PHYSICIAN ASSISTANT

## 2025-05-07 PROCEDURE — 3074F SYST BP LT 130 MM HG: CPT | Performed by: PHYSICIAN ASSISTANT

## 2025-05-07 PROCEDURE — 3078F DIAST BP <80 MM HG: CPT | Performed by: PHYSICIAN ASSISTANT

## 2025-05-07 NOTE — TELEPHONE ENCOUNTER
Prior authorization request completed for: bilateral L4/5,L5/S1 MBB  Authorization #   [id number]  Authorization dates: 5/7/25-6/5/25  CPT codes approved: 20011,53179  Number of visits/dates of service approved: 1  Physician: collins  Location: [facility]     Patient can be scheduled. Routed to Navigator.

## 2025-05-07 NOTE — PATIENT INSTRUCTIONS
Refill policies:    Allow 2-3 business days for refills; controlled substances may take longer.  Contact your pharmacy at least 5 days prior to running out of medication and have them send an electronic request or submit request through the “request refill” option in your [email or web address] account.  Refills are not addressed on weekends; covering physicians do not authorize routine medications on weekends.  No narcotics or controlled substances are refilled after noon on Fridays or by on call physicians.  By law, narcotics must be electronically prescribed.  A 30 day supply with no refills is the maximum allowed.  If your prescription is due for a refill, you may be due for a follow up appointment.  To best provide you care, patients receiving routine medications need to be seen at least once a year.  Patients receiving narcotic/controlled substance medications need to be seen at least once every 3 months.  In the event that your preferred pharmacy does not have the requested medication in stock (e.g. Backordered), it is your responsibility to find another pharmacy that has the requested medication available.  We will gladly send a new prescription to that pharmacy at your request.    Scheduling Tests:    If your physician has ordered radiology tests such as MRI or CT scans, please contact Central Scheduling at [phone] right away to schedule the test.  Once scheduled, the [facility] Centralized Referral Team will work with your insurance carrier to obtain pre-certification or prior authorization.  Depending on your insurance carrier, approval may take 3-10 days.  It is highly recommended patients assure they have received an authorization before having a test performed.  If test is done without insurance authorization, patient may be responsible for the entire amount billed.      Precertification and Prior Authorizations:  If your physician has recommended that you have a procedure or additional testing performed the [facility]  Centralized Referral Team will contact your insurance carrier to obtain pre-certification or prior authorization.    You are strongly encouraged to contact your insurance carrier to verify that your procedure/test has been approved and is a COVERED benefit.  Although the [facility] Centralized Referral Team does its due diligence, the insurance carrier gives the disclaimer that \"Although the procedure is authorized, this does not guarantee payment.\"    Ultimately the patient is responsible for payment.   Thank you for your understanding in this matter.  Paperwork Completion:  If you require FMLA or disability paperwork for your recovery, please make sure to either drop it off or have it faxed to our office at [phone]. Be sure the form has your name and date of birth on it.  The form will be faxed to our Forms Department and they will complete it for you.  There is a 25$ fee for all forms that need to be filled out.  Please be aware there is a 10-14 day turnaround time.  You will need to sign a release of information (ALEXEY) form if your paperwork does not come with one.  You may call the Forms Department with any questions at [phone].  Their fax number is [phone].

## 2025-05-07 NOTE — PROGRESS NOTES
HPI:   Harjinder Schmitz . presents with complaints of Low back pain.    The pain is described as moderate aching that is intermittent.  The patient’s activity level has remained the same since last visit.  The pain is worst unrelated to time of day.    Changes in condition/history since last visit: here today for follow up, having been seen last on 8/5/24.  At that time, had reported 80% relief with bilateral L4-5 and L5-S1 medial branch block on 12/16/2021, and for 7 to 8 months, was very pleased with his response.  Pain had returned, and we had discussed repeating procedure.  Unfortunately, citing anxiety about the procedure, decided not to proceed.  States that he has been active with PT, and while this was somewhat effective, over the past few months, any results have waned.  Active with HEP, without relief.  Continued with significant anxiety, and had been referred to behavioral health, though did not pursue.  Wishes to repeat MBNB.      Last lumbar procedure: B MBNB L4, L5    Date: 12/16/21    Percentage of relief experienced from the procedure: 80%    Duration of the relief: 7-8 months    Previous lumbar procedure:  LESI Date:  11/4/21    % relief:  Limited     Duration:  n/a    Previous cervical procedure:  PRADEEP Date:  10/28/21    % relief:  Limited     Duration:  n/a    The following activities will increase the patient’s pain: walking, standing    The following activities decrease the patient’s pain: limiting activity level    Functional Assessment: Patient reports that they are able to complete all of their ADL's such as eating, bathing, using the toilet, dressing and getting up from a bed or a chair independently.    Current Medications:  Current Outpatient Medications   Medication Sig Dispense Refill    predniSONE 20 MG Oral Tab TAKE 2 TABS BY MOUTH DAILY FOR 5 DAYS (Patient not taking: Reported on 5/7/2025) 10 tablet 0    cyclobenzaprine 10 MG Oral Tab Take 1 tablet (10 mg total) by mouth 2 (two) times  daily as needed for Muscle spasms. (Patient not taking: Reported on 5/7/2025) 30 tablet 0    Meloxicam 7.5 MG Oral Tab Take 1 tablet (7.5 mg total) by mouth daily. (Patient not taking: Reported on 5/7/2025) 15 tablet 1      Patient requires assistance with: No assistance required    Reviewed Patient History Dated: 8/5/24 no changes noted    Physical Exam:   /78 (BP Location: Right arm, Patient Position: Sitting, Cuff Size: large)   Pulse 98   SpO2 91%   VAS Pain Score:  6/10  General Appearance: Well developed, well nourished, normal build, independent body habitus, no apparent physical disabilities, well groomed    Neurological Exam: WNL-Orientation to time, place and person, normal mood & effect, normal concentration & attention span  Inspection: non-antalgic, no acute distress  Pain with lumbar extension  Pain to palpation inferior lumbar facets and paraspinal musculature   Radiology/Lab Test Reviewed: MRI L spine:  CONCLUSION:         Broad-based disc bulge effaces bilateral lateral recesses at L5-S1 and may minimally abut the S1 nerve roots.  This is unchanged when compared to prior examination.       There is no significant spinal canal or neural foraminal stenosis.       Left foraminal disc herniation at L3-L4 is noted.  This is not causing significant neural foraminal stenosis      Lab Results   Component Value Date    WBC 5.7 01/09/2015    WBC 7.1 10/03/2014   No results found for: \"HEMOGLOBIN\"  Lab Results   Component Value Date    .0 01/09/2015    .0 10/03/2014       Do you have any known blood/bleeding disorders?  No  Does patient currently take blood thinners?   None  Does patient currently take any antibiotics?   No  Patient educated and verbalized understanding.  Medical Decision Making:   Diagnosis:    Encounter Diagnosis   Name Primary?    Lumbar spondylosis Yes     Impression: Excellent relief with previous bilateral L4 and L5 medial branch blocks, reporting 80% relief for  approximately 8 months.  Had discussed repeating injections, though citing anxiety about procedure, decided against this.  He has continued to struggle with anxiety, and had placed order for lomg bhi, though he has yet to reach out to them.  For a time, PT and HEP had been helpful to some degree, though pain is no longer responding to conservative management, and wishes to simply repeat MBNB.  Order placed.     Plan: Patient to schedule the following injection: bilateral lumbar MBNB Levels: L4/5 and L5/S1, Procedure and risks were discussed with pt. including headache, bleeding, infection and potential nerve damage.  F/u 2-3 weeks.  Continue to recommend behavioral health for anxiety.      No orders of the defined types were placed in this encounter.      Meds & Refills for this Visit:  Requested Prescriptions      No prescriptions requested or ordered in this encounter       Imaging & Consults:  None    The patient indicates understanding of these issues and agrees to the plan.    DARRIUS Patel

## 2025-05-07 NOTE — PROGRESS NOTES
Patient presents in office today with reported sharp/aching/shooting pain in back and neck.     Current pain level reported = 5/10    Last reported dose of None    Narcotic Contract renewal N/A    Urine Drug screen N/A

## 2025-05-08 RX ORDER — CYCLOBENZAPRINE HCL 10 MG
10 TABLET ORAL 2 TIMES DAILY PRN
Qty: 30 TABLET | Refills: 0 | Status: SHIPPED | OUTPATIENT
Start: 2025-05-08

## 2025-05-08 RX ORDER — MELOXICAM 7.5 MG/1
7.5 TABLET ORAL DAILY
Qty: 15 TABLET | Refills: 1 | Status: SHIPPED | OUTPATIENT
Start: 2025-05-08

## 2025-05-08 NOTE — TELEPHONE ENCOUNTER
Call transferred from PSR - patient returning call to schedule     Patient advised of insurance approval to proceed with injections and is agreeable to scheduling. pre-procedure instructions reviewed. Patient prefers conscious  sedation. Reviewed sedation instructions including Fast 8 hours prior &  Required. Patient encouraged but not required to hold Meloxicam/NSAIDs for 24 hours prior to procedure. Patient verbalized understanding of instructions, no further needs at this time.     Patient asked if he would be able to get work note for the day of and after procedure? Informed patient that would need to check with Conor for work note.   Patient mentioned that he will actually call back if work note needed, as he believes he still has FMLA time available.     [facility]  PRE-PROCEDURE INSTRUCTIONS WITH IV SEDATION     Procedure: Bilateral L4/5,L5/S1 MBB    Appointment Date: 05/27/2025    Check-In Time: 08:30 AM     Follow-Up Date/Time: 06/09/2025 @ 09:00 AM    Prior to the procedure:  Please update us prior to the procedure if you are experiencing any symptoms of infection such as cough, fever, chills, urinary symptoms, or have recently been prescribed antibiotics, have open wounds, have recently had surgery or dental procedures.    Day of Procedure:  Do not eat or drink anything (including water) 8 hours prior to your procedure.  If you take morning blood pressure medication or oral diabetic medication, please take with a small sip of water.  You are required to have a responsible adult drive you home after your procedure. You may not take a cab or ride share unless you have a responsible adult with you in the cab or ride share.  A family member or friend is required to stay in our waiting room or hospital garage because of the sedation you will receive, you may be sleepy and forgetful. You may not remember anything told to you after your procedure including discharge instructions. Please  note: children are not allowed in the holding area so please make appropriate arrangements.  Any additional family members and friends will need to remain in the surgical waiting room or hospital garage for the duration of your procedure. *If your family member or friend elects to wait in the garage, they must leave a cell phone number with the lab staff in case they need to be reached.  Please park in the [facility] garage and follow the signs to the [facility].  Please bring your Insurance Card, Photo ID, List of Current Medications and Referral (if applicable) to your appointment. Check in at [address]) outpatient registration in the [facility].  Please note-No prescriptions will be written by Pain Clinic in OR on the day of procedure. If you require a refill of medications, please contact the office 48 hours prior to your procedure.  If you have an implanted Spinal Cord or Peripheral Nerve Stimulator: Please remember to turn device off for procedure    *If you are fasting, you may take blood pressure and thyroid medications with a small sip of water the day of your procedure.   *If you are diabetic, your glucose must be within a normal range for you. If you are fasting, you should check your glucose levels and adjust with medication if needed.    Medication Hold:  Number of days you need to be off for the following medications:    Aggrenox 10 days   Agrylin (Anagrelide) 10 days  Brilinta (Ticagrelor) 7 days  Imbruvica (Ibrutinib) 3 days   Enbrel (Etanercept) 24 hours   Fragmin (Dalteparin) 24 hours   Pletal (Cilostazol) 7 days  Effient (Prasugrel) 7 days  Pradaxa 10 days  Trental 7 days  Eliquis (Apixaban) 3 days  Xarelto (Rivaroxaban) 3 days  Lovenox (Enoxaparin) 24 hours  Aspirin  Greater than 81mg but less than 325mg   5 days  325mg and greater                  7 days  (*81 mg      24 hours preferred, but not required)  Coumadin       5 days  Procedure may be  cancelled if INR is elevated.   Excedrin (with aspirin) 7 days  Plavix (Clopidogrel)                             7 days    NSAIDs: 24 hours preferred, but not required      Ibuprofen (Motrin, Advil, Vicoprofen), Naproxen (Naprosyn, Aleve), Piroxcam (Feldene), Meloxicam (Mobic), Oxaprozin (Daypro), Diclofenac (Voltaren), Indomethacin (Indocin), Etodolac (Lodine), Nabumetone (Relafen), Celebrex (Celecoxib)           HERBAL SUPPLEMENTS  5 days preferred, but not required  Fish oil, krill oil, Omega-3, Vascepa, Vitamin E, Turmeric, Garlic                       Insurance Authorization:   Most insurances are now requiring a preauthorization for all procedures.     Please contact your insurance carrier to determine what your financial responsibility will be for the procedure(s).    Cancellation/Rescheduling Appointment:   In the event you need to cancel or reschedule your appointment, you must notify the office 24 hours prior.    Post-procedure instructions:        Please schedule a follow up visit within 2 to 4 weeks after your last procedure date   Please call our office with any questions or concerns before or after your procedure at [phone], #2.  If you are a diabetic, please increase the frequency of your glucose monitoring after the procedure as this may cause a temporary increase in your blood sugar. Contact your primary care physician if your blood sugar rises as you may require some medication adjustment.        It is normal to have increased pain at injection site for up to 3-5 days after procedure, you can        use heat or ice (20 minutes on 20 minutes off) for comfort.

## 2025-05-08 NOTE — TELEPHONE ENCOUNTER
Patient asked if provider could give him valium or any other medication to help him relax prior to getting IV sedation for MBB? -- explained to patient that with twilight sedation that will keep him drowsy and relaxed during procedure, but will double check with . patient VU

## 2025-05-09 RX ORDER — DIAZEPAM 10 MG/1
10 TABLET ORAL
Qty: 1 TABLET | Refills: 0 | Status: SHIPPED | OUTPATIENT
Start: 2025-05-09 | End: 2025-05-09

## 2025-05-09 NOTE — TELEPHONE ENCOUNTER
Confirmed w/ -- script for Diazepam (1 tablet) sent to patient's pharmacy.   Diazepam to be taken at the time of check in (1 hour prior to procedure).     Disp Refills Start End    diazePAM 10 MG Oral Tab 1 tablet 0 5/9/2025 5/9/2025    Sig - Route: Take 1 tablet (10 mg total) by mouth 1 (one) time if needed. - Oral    Sent to pharmacy as: diazePAM 10 MG Oral Tablet (Valium)    E-Prescribing Status: Receipt confirmed by pharmacy (5/9/2025 12:15 PM CDT)      Pharmacy    [facility]/PHARMACY [id number] [place] [address] AT [facility], [phone], [phone]

## 2025-05-12 NOTE — TELEPHONE ENCOUNTER
Returned patient call -- informed patient that  has sent Diazepam to his pharmacy. Instructed patient to take Diazepam tablet at the time of check in. Patient MARQUIS.

## 2025-06-05 ENCOUNTER — APPOINTMENT (OUTPATIENT)
Dept: CT IMAGING | Age: 45
End: 2025-06-05
Attending: EMERGENCY MEDICINE
Payer: COMMERCIAL

## 2025-06-05 ENCOUNTER — TELEPHONE (OUTPATIENT)
Dept: FAMILY MEDICINE CLINIC | Facility: CLINIC | Age: 45
End: 2025-06-05

## 2025-06-05 ENCOUNTER — HOSPITAL ENCOUNTER (EMERGENCY)
Age: 45
Discharge: HOME | End: 2025-06-05
Attending: EMERGENCY MEDICINE
Payer: COMMERCIAL

## 2025-06-05 VITALS
WEIGHT: 210 LBS | HEIGHT: 73 IN | TEMPERATURE: 98 F | BODY MASS INDEX: 27.83 KG/M2 | HEART RATE: 69 BPM | SYSTOLIC BLOOD PRESSURE: 134 MMHG | DIASTOLIC BLOOD PRESSURE: 95 MMHG | OXYGEN SATURATION: 100 % | RESPIRATION RATE: 18 BRPM

## 2025-06-05 DIAGNOSIS — H61.21 IMPACTED CERUMEN OF RIGHT EAR: Primary | ICD-10-CM

## 2025-06-05 DIAGNOSIS — H81.391 PERIPHERAL VERTIGO INVOLVING RIGHT EAR: ICD-10-CM

## 2025-06-05 LAB
ATRIAL RATE: 66 BPM
P AXIS: 66 DEGREES
P-R INTERVAL: 164 MS
Q-T INTERVAL: 376 MS
QRS DURATION: 74 MS
QTC CALCULATION (BEZET): 394 MS
R AXIS: 6 DEGREES
T AXIS: 3 DEGREES
VENTRICULAR RATE: 66 BPM

## 2025-06-05 PROCEDURE — 93010 ELECTROCARDIOGRAM REPORT: CPT

## 2025-06-05 PROCEDURE — 93005 ELECTROCARDIOGRAM TRACING: CPT

## 2025-06-05 PROCEDURE — 96375 TX/PRO/DX INJ NEW DRUG ADDON: CPT

## 2025-06-05 PROCEDURE — 70450 CT HEAD/BRAIN W/O DYE: CPT | Performed by: EMERGENCY MEDICINE

## 2025-06-05 PROCEDURE — 69209 REMOVE IMPACTED EAR WAX UNI: CPT

## 2025-06-05 PROCEDURE — 96361 HYDRATE IV INFUSION ADD-ON: CPT

## 2025-06-05 PROCEDURE — 96374 THER/PROPH/DIAG INJ IV PUSH: CPT

## 2025-06-05 PROCEDURE — 99284 EMERGENCY DEPT VISIT MOD MDM: CPT

## 2025-06-05 RX ORDER — DIAZEPAM 10 MG/2ML
5 INJECTION, SOLUTION INTRAMUSCULAR; INTRAVENOUS ONCE
Status: COMPLETED | OUTPATIENT
Start: 2025-06-05 | End: 2025-06-05

## 2025-06-05 RX ORDER — PROCHLORPERAZINE EDISYLATE 5 MG/ML
10 INJECTION INTRAMUSCULAR; INTRAVENOUS ONCE
Status: COMPLETED | OUTPATIENT
Start: 2025-06-05 | End: 2025-06-05

## 2025-06-05 RX ORDER — MECLIZINE HYDROCHLORIDE 50 MG/1
50 TABLET ORAL 3 TIMES DAILY PRN
Qty: 15 TABLET | Refills: 0 | Status: SHIPPED | OUTPATIENT
Start: 2025-06-05 | End: 2025-06-10

## 2025-06-05 RX ORDER — MECLIZINE HYDROCHLORIDE 25 MG/1
25 TABLET ORAL ONCE
Status: COMPLETED | OUTPATIENT
Start: 2025-06-05 | End: 2025-06-05

## 2025-06-05 NOTE — TELEPHONE ENCOUNTER
Patient has been feeling dizzy since Tuesday morning. Patient states it feels like vertigo but has never had this happen to him before. Appointment scheduled 6/23. Transferring call live.    Hypertension

## 2025-06-05 NOTE — TELEPHONE ENCOUNTER
The patient was transferred as a live call. Since Tuesday am he has been extremely dizzy. As the day progresses it does improve some. He stated \" I had 2 mini strokes before I was 31.\" He does have nausea , no vomiting. He denies any chest pain or shortness of breath. He stated \" when I look down everything goes to the left.\" I asked if he has weakness in his extremities and he stated  \" yes, but I always seem to have  that.\" I spoke to Dr. Tabor and he advised the patient go to the [place] [facility] now for evaluation. Pt. Agreed to plan and verbalized understanding

## 2025-06-05 NOTE — ED PROVIDER NOTES
Patient Seen in: ward Emergency Department In [place]      History  Chief Complaint   Patient presents with    Dizziness     Stated Complaint: dizziness _room spinning_ since tuesday    Subjective:   The history is provided by the patient.       44-year-old man presenting with intermittent dizziness for the past 2 days.  Patient states his dizziness is worse with going from sitting to standing position, head turning, looking up and down.  Denies headache, neck pain, vomiting, diarrhea, URI symptoms, cough.  Patient reports associated tinnitus and nausea.  No prior episodes.    Objective:     Past Medical History:    Back pain    Back problem    Esophageal reflux    Stroke (HCC)    TIA              History reviewed. No pertinent surgical history.             Social History     Socioeconomic History    Marital status:    Tobacco Use    Smoking status: Never    Smokeless tobacco: Never   Vaping Use    Vaping status: Never Used   Substance and Sexual Activity    Alcohol use: Yes     Comment: occasionally    Drug use: Yes     Types: Cannabis     Comment: marijuana   Other Topics Concern    Caffeine Concern No    Exercise No     Social Drivers of Health     Food Insecurity: No Food Insecurity (8/6/2024)    Food Insecurity     Food Insecurity: Never true   Transportation Needs: No Transportation Needs (8/6/2024)    Transportation Needs     Lack of Transportation: No   Housing Stability: Low Risk  (8/6/2024)    Housing Stability     Housing Instability: No                                Physical Exam    ED Triage Vitals [06/05/25 1329]   BP (!) 150/97   Pulse 74   Resp 16   Temp 98 °F (36.7 °C)   Temp src Temporal   SpO2 98 %   O2 Device None (Room air)       Current Vitals:   Vital Signs  BP: (!) 141/108  Pulse: 68  Resp: 18  Temp: 98 °F (36.7 °C)  Temp src: Temporal    Oxygen Therapy  SpO2: 99 %  O2 Device: None (Room air)            Physical Exam  Vitals and nursing note reviewed.   Constitutional:        General: He is not in acute distress.     Appearance: He is well-developed. He is diaphoretic.   HENT:      Head: Normocephalic and atraumatic.      Right Ear: There is impacted cerumen.   Eyes:      Extraocular Movements: Extraocular movements intact.      Pupils: Pupils are equal, round, and reactive to light.   Cardiovascular:      Rate and Rhythm: Normal rate and regular rhythm.      Pulses: Normal pulses.      Heart sounds: Normal heart sounds. No murmur heard.     No gallop.   Pulmonary:      Effort: Pulmonary effort is normal. No respiratory distress.      Breath sounds: Normal breath sounds.   Abdominal:      General: Abdomen is flat.      Palpations: Abdomen is soft.      Tenderness: There is no abdominal tenderness.   Musculoskeletal:      Cervical back: Neck supple.   Skin:     General: Skin is warm.      Capillary Refill: Capillary refill takes less than 2 seconds.   Neurological:      General: No focal deficit present.      Mental Status: He is alert and oriented to person, place, and time.      Cranial Nerves: No cranial nerve deficit, dysarthria or facial asymmetry.      Sensory: Sensory deficit (Decreease sensation to light touch on the right upper lower extremity) present.      Comments: Positive Kacie-Hallpike maneuver on the right, left beating nystagmus   Psychiatric:         Mood and Affect: Mood is anxious.         Behavior: Behavior normal.                 ED Course  Labs Reviewed   RAINBOW DRAW LAVENDER   RAINBOW DRAW LIGHT GREEN   RAINBOW DRAW BLUE     EKG    Rate, intervals and axes as noted on EKG Report.  Rate: 66  Rhythm: Sinus Rhythm  Reading: Normal axis.  Normal GA, QRS, QTc interval.  Normal ST-T segments.           ED Course as of 06/05/25 1654  ------------------------------------------------------------  Time: 06/05 1508  Comment: Overall improved after meclizine, IV Valium, ear irrigation.  Patient is able to stand with minimal symptoms.  Patient becomes symptomatic upon sitting.   Has persistent paresthesias in the right arm and leg so we will get CT head  ------------------------------------------------------------  Time: 06/05 1651  Comment: Patient subjectively improved after IV Compazine.  ------------------------------------------------------------  Time: 06/05 1651  Comment: Updated patient with CT findings.  Advise follow-up with the PMD or ENT, Epley maneuver's as needed, meclizine as needed  ------------------------------------------------------------  Time: 06/05 1654  Comment: I independently interpreted CT head, no acute intracranial hemorrhage.  Additional details per radiology                       MDM     Differential diagnosis includes otitis media, labyrinthitis, cerumen impaction, BPPV        Medical Decision Making  #Vertigo  Patient presented with positional vertigo with associated tinnitus and cerumen impaction in right ear canal  I suspect the cerumen impaction is contributing to vertigo, ear irrigated, impaction cleared, with minimal residual symptoms.  I advised patient if symptoms are persistent tomorrow, likely with BPPV, advised meclizine as needed and Epley maneuvers  PMD/neurology follow-up for reassessment    Amount and/or Complexity of Data Reviewed  Radiology: ordered and independent interpretation performed. Decision-making details documented in ED Course.  ECG/medicine tests: ordered and independent interpretation performed. Decision-making details documented in ED Course.    Risk  Prescription drug management.  Parenteral controlled substances.        Disposition and Plan     Clinical Impression:  1. Impacted cerumen of right ear    2. Peripheral vertigo involving right ear         Disposition:  Discharge  6/5/2025  4:54 pm    Follow-up:  Victor Hugo Tabor DO  [address] [place] [id number]  [phone]    Schedule an appointment as soon as possible for a visit in 1 week(s)  If symptoms worsen    Kiel Fermin MD  [address]  [facility] [address]  [phone]    Follow up  Contact neurology or ENT if your symptoms are persistent or recurrent          Medications Prescribed:  Current Discharge Medication List        START taking these medications    Details   Meclizine HCl 50 MG Oral Tab Take 1 tablet (50 mg total) by mouth 3 (three) times daily as needed for Dizziness.  Qty: 15 tablet, Refills: 0      carbamide peroxide 6.5 % Otic Solution Place 5 drops into the right ear 2 (two) times daily for 5 days.  Qty: 1 each, Refills: 0                   Supplementary Documentation:

## 2025-06-23 ENCOUNTER — OFFICE VISIT (OUTPATIENT)
Dept: FAMILY MEDICINE CLINIC | Facility: CLINIC | Age: 45
End: 2025-06-23
Payer: COMMERCIAL

## 2025-06-23 VITALS
SYSTOLIC BLOOD PRESSURE: 118 MMHG | WEIGHT: 208 LBS | RESPIRATION RATE: 16 BRPM | HEART RATE: 78 BPM | BODY MASS INDEX: 27.57 KG/M2 | HEIGHT: 73 IN | TEMPERATURE: 99 F | DIASTOLIC BLOOD PRESSURE: 70 MMHG

## 2025-06-23 DIAGNOSIS — M54.16 CHRONIC LUMBAR RADICULOPATHY: Primary | ICD-10-CM

## 2025-06-23 DIAGNOSIS — M54.12 CHRONIC CERVICAL RADICULOPATHY: ICD-10-CM

## 2025-06-23 PROCEDURE — 3078F DIAST BP <80 MM HG: CPT | Performed by: FAMILY MEDICINE

## 2025-06-23 PROCEDURE — 3074F SYST BP LT 130 MM HG: CPT | Performed by: FAMILY MEDICINE

## 2025-06-23 PROCEDURE — 3008F BODY MASS INDEX DOCD: CPT | Performed by: FAMILY MEDICINE

## 2025-06-23 PROCEDURE — G2211 COMPLEX E/M VISIT ADD ON: HCPCS | Performed by: FAMILY MEDICINE

## 2025-06-23 PROCEDURE — 99214 OFFICE O/P EST MOD 30 MIN: CPT | Performed by: FAMILY MEDICINE

## 2025-06-23 RX ORDER — PREDNISONE 20 MG/1
TABLET ORAL
Qty: 20 TABLET | Refills: 0 | Status: SHIPPED | OUTPATIENT
Start: 2025-06-23

## 2025-06-23 NOTE — PROGRESS NOTES
The following individual(s) verbally consented to be recorded using ambient AI listening technology and understand that they can each withdraw their consent to this listening technology at any point by asking the clinician to turn off or pause the recording:    Patient name: Harjinder Schmitz Jr.  Additional names:

## 2025-06-25 NOTE — PROGRESS NOTES
[facility] Office Note  Chief Complaint:   Chief Complaint   Patient presents with    Back Pain     Was seen in ER- vertigo, x2 weeks ago.   Back pain is getting worst, making it hard for work. Looking for referrals.   Also having muscle spasms.        HPI:     History of Present Illness  Harjinder Schmitz Jr. is a 44 year old male who presents with persistent lower back pain.    He has been experiencing persistent lower back pain for the past few months, primarily located in the lower back, predominantly on the left side, but also affecting the right side. The pain has not improved over time and occasionally worsens, leading to missed work and significant distress due to concerns about job security.    In addition to the lower back pain, he experiences neck pain, described as a sensation of almost pinching a nerve. This occurs periodically and causes him to move cautiously to avoid exacerbating the pain. He has not had recent imaging since 2021, except for some imaging done during a recent emergency room visit for vertigo.    He has a history of vertigo, which he experienced recently, causing him to miss work. He describes waking up with vertigo, being unable to get out of bed, and eventually going to the emergency room where it was diagnosed.    He mentions a foot issue where bending his toes causes them to feel stuck and painful for a few seconds. This has been happening more consistently and is different from previous gout symptoms he has experienced. Prednisone has helped with his symptoms in the past, although it caused him to sweat a lot.         Past Medical History[1]  Past Surgical History[2]  Social History:  Short Social Hx on File[3]  Family History:  Family History[4]  Allergies:  Allergies[5]  Current Meds:  Current Medications[6]   Counseling given: Not Answered       REVIEW OF SYSTEMS:   ROS:  CONSTITUTIONAL:  Denies any unusual weight gain/loss, fever, chills, weakness or  fatigue.  CARDIOVASCULAR:  Denies chest pain, chest pressure or chest discomfort. No palpitations or edema.  Denies any dyspnea on exertion or at rest  RESPIRATORY:  Denies shortness of breath, cough  GASTROINTESTINAL:  Denies any abdominal pain, nausea, vomiting  NEUROLOGICAL:  Denies headache, dizziness, syncope, numbness or tingling in the extremities.  MUSCULOSKELETAL:  + acute on chronic low back pain    EXAM:   /70   Pulse 78   Temp 98.7 °F (37.1 °C) (Temporal)   Resp 16   Ht 6' 1\" (1.854 m)   Wt 208 lb (94.3 kg)   BMI 27.44 kg/m²  Estimated body mass index is 27.44 kg/m² as calculated from the following:    Height as of this encounter: 6' 1\" (1.854 m).    Weight as of this encounter: 208 lb (94.3 kg).   Vital signs reviewed.Appears stated age, well groomed.  Physical Exam:  GEN:  Patient is alert and oriented x3, no apparent distress  HEAD:  Normocephalic, atraumatic  HEENT:  Eyes: EOMI, PERRLA, no scleral icterus, conjunctivae clear bilaterally.  Ears: TM's clear and visible bilaterally, no excess cerumen or erythema.  Throat:  No tonsillar erythema or exudate.  Mouth:  No oral lesions or ulcerations, no dental abnormalities noted.  LUNGS: clear to auscultation bilaterally, no rales/rhonchi/wheezing  HEART:  Regular rate and rhythm, no murmurs, rubs or gallops  ABDOMEN:  Soft, nondistended, nontender, bowel sounds normal in all 4 quadrants, no hepatosplenomegaly  EXTREMITIES:  Strength intact with 5/5 bilaterally upper and lower extremities, no edema noted  NEURO:  CN 2 - 12 grossly intact     ASSESSMENT AND PLAN:   1. Chronic lumbar radiculopathy  - MRI SPINE LUMBAR (CPT=72148); Future  - predniSONE 20 MG Oral Tab; 3 tabs PO daily x 3d, 2 tabs PO daily x 3d, 1 tab PO daily x 3d, 1/2 tab PO daily x 3d  Dispense: 20 tablet; Refill: 0    2. Chronic cervical radiculopathy  - MRI SPINE CERVICAL (CPT=72141); Future  - predniSONE 20 MG Oral Tab; 3 tabs PO daily x 3d, 2 tabs PO daily x 3d, 1 tab PO  daily x 3d, 1/2 tab PO daily x 3d  Dispense: 20 tablet; Refill: 0      Assessment & Plan  Low back pain with radiculopathy and cervical radiculopathy  Acute on chronic left-sided low back pain, likely due to herniated disc or bulge, causing functional impairment. Cervical nerve impingement considered but lumbar region is primary concern. Prefers oral medication.  - Order MRI of cervical and lumbar spine to assess for herniated disc or bulge.  - Prescribe short course of prednisone for inflammation and temporary relief.    Left foot pain  Intermittent toe cramping and pain, not related to gout. Possible electrolyte imbalance, particularly hypokalemia.  - Check electrolytes, including potassium and calcium.  - Advise on dietary intake of potassium-rich foods.  - Consider magnesium supplementation if symptoms persist.    Vertigo  Recent vertigo episode resolved.    Follow-up  Follow-up needed to review MRI results and adjust treatment plan.  - Review MRI results and discuss management options.          Meds & Refills for this Visit:  Requested Prescriptions     Signed Prescriptions Disp Refills    predniSONE 20 MG Oral Tab 20 tablet 0     Sig: 3 tabs PO daily x 3d, 2 tabs PO daily x 3d, 1 tab PO daily x 3d, 1/2 tab PO daily x 3d       Health Maintenance:  Health Maintenance Due   Topic Date Due    COVID-19 Vaccine (1 - 2024-25 season) Never done       Patient/Caregiver Education: Patient/Caregiver Education: There are no barriers to learning. Medical education done.   Outcome: Patient verbalizes understanding. Patient is notified to call with any questions, complications, allergies, or worsening or changing symptoms.  Patient is to call with any side effects or complications from the treatments as a result of today.     Problem List:  Problem List[7]    SADIE ESPINOZA, DO    Please note that portions of this note may have been completed with a voice recognition program. Efforts were made to edit the dictations but  occasionally words are mis-transcribed.         [1]   Past Medical History:   Back pain    Back problem    Esophageal reflux    Stroke (HCC)    TIA   [2] History reviewed. No pertinent surgical history.  [3]   Social History  Socioeconomic History    Marital status:    Tobacco Use    Smoking status: Never    Smokeless tobacco: Never   Vaping Use    Vaping status: Never Used   Substance and Sexual Activity    Alcohol use: Yes     Comment: occasionally    Drug use: Yes     Types: Cannabis     Comment: marijuana   Other Topics Concern    Caffeine Concern No    Exercise No     Social Drivers of Health     Food Insecurity: No Food Insecurity (8/6/2024)    Food Insecurity     Food Insecurity: Never true   Transportation Needs: No Transportation Needs (8/6/2024)    Transportation Needs     Lack of Transportation: No   Stress: Stress Concern Present (8/6/2024)    Stress     Feeling of Stress : Yes   Housing Stability: Low Risk  (8/6/2024)    Housing Stability     Housing Instability: No   [4]   Family History  Problem Relation Age of Onset    Other (Other) Father         Alcoholic    Other (Other) Sister         lupus   [5]   Allergies  Allergen Reactions    Shrimp NAUSEA AND VOMITING   [6]   Current Outpatient Medications   Medication Sig Dispense Refill    predniSONE 20 MG Oral Tab 3 tabs PO daily x 3d, 2 tabs PO daily x 3d, 1 tab PO daily x 3d, 1/2 tab PO daily x 3d 20 tablet 0    Meloxicam 7.5 MG Oral Tab Take 1 tablet (7.5 mg total) by mouth daily. (Patient taking differently: Take 1 tablet (7.5 mg total) by mouth as needed for Pain.) 15 tablet 1    cyclobenzaprine 10 MG Oral Tab Take 1 tablet (10 mg total) by mouth 2 (two) times daily as needed for Muscle spasms. 30 tablet 0    predniSONE 20 MG Oral Tab TAKE 2 TABS BY MOUTH DAILY FOR 5 DAYS (Patient not taking: Reported on 6/23/2025) 10 tablet 0   [7]   Patient Active Problem List  Diagnosis    Unspecified acute reaction to stress    Fatigue    Neck muscle  spasm    DDD (degenerative disc disease), cervical    Cervical myelopathy with cervical radiculopathy (HCC)    DDD (degenerative disc disease), lumbar    Lumbar spondylosis

## (undated) DEVICE — PAIN TRAY: Brand: MEDLINE INDUSTRIES, INC.

## (undated) DEVICE — BANDAID COVERLET 1X3

## (undated) DEVICE — GLOVE SURG SENSICARE SZ 7-1/2

## (undated) DEVICE — CHLORAPREP ORANGE TINT 10.5ML

## (undated) DEVICE — GLOVE SURG SENSICARE SZ 6-1/2

## (undated) DEVICE — REMOVER DURAPREP 3M

## (undated) DEVICE — Device

## (undated) DEVICE — AVANOS* TUOHY EPIDURAL NEEDLE: Brand: AVANOS

## (undated) DEVICE — MARKER SKIN 2 TIP

## (undated) DEVICE — NEEDLE SPINAL 22X3-1/2 BLK

## (undated) NOTE — LETTER
June 5, 2025    Patient: Harjinder Schmitz .   Date of Visit: 6/5/2025       To Whom It May Concern:    Harjinder Schmitz was seen and treated in our emergency department on 6/5/2025. He should not return to work until 06/08/2025.    If you have any questions or concerns, please don't hesitate to call.       Encounter Provider(s):    Ricardo Daniel MD

## (undated) NOTE — LETTER
Date: 10/18/2021    Patient Name: Mark Mccoy. To Whom it may concern: This letter has been written at the patient's request. The above patient was seen at the [facility] for treatment of a medical condition.         The wiley

## (undated) NOTE — LETTER
[facility] [address], [address] [facility]    Consent for Anesthesia   1.    Roberto Carrasco agree to be cared for by a physician anesthesiologist alone and/or with a nurse anesthetist, who is specially trained to monitor me and give allergic reactions to medications, injury to my airway, heart, lungs, vision, nerves, or muscles and in extremely rare instances death. 5. My doctor has explained to me other choices available to me for my care (alternatives).   6. Pregnant Patients (“epid Printed: 8/13/2021 at 11:33 AM    Medical Record #: [id number]                                            Page 1 of 1

## (undated) NOTE — LETTER
[facility]  Phone: [phone]  Right Fax: [phone]    ADDRESSEE INFORMATION: SENDER INFORMATION:   To:   Dr Agueda Alan   From: Joseph Snyder RN     Department: Pre-Admission Testing   Fax Number: [phone] Date: 8/11/2021     Karan this communication in error, please immediately notify us by telephone and return the original message to us at [address] via the [facility].   10/5/2018

## (undated) NOTE — LETTER
September 13, 2021    Patient: Perfecto Hayes. Date of Visit: 9/13/2021       To Whom It May Concern:    Cece Aly was seen and treated in our emergency department on 9/13/2021. He should not return to work until 9/15/2021.     If you have any q

## (undated) NOTE — LETTER
Date: 8/9/2021    Patient Name: Dary Crawford. To Whom it may concern: This letter has been written at the patient's request. The above patient was seen at the [facility] for treatment of a medical condition.     This patient s

## (undated) NOTE — LETTER
10/19/2021        [address] [place] [address]       To Whom it may concern:     This letter has been written at the patient's request. The above patient was seen at the [facility] for treatment of a med

## (undated) NOTE — LETTER
Date: 11/9/2021    Patient Name: Kye Rojas.             To Whom it may concern:     This letter has been written at the patient's request. The above patient was seen at the [facility] for treatment of a medical condition.        The p

## (undated) NOTE — LETTER
Date: 8/13/2021    Patient Name: Stevenson Dao To Whom it may concern: This letter has been written at the patient's request. The above patient was seen at the [facility] for treatment of a medical condition.     This patient

## (undated) NOTE — LETTER
Date: 11/16/2021    Patient Name: Alec Bear. To Whom it may concern: This letter has been written at the patient's request. The above patient was seen at the [facility] for treatment of a medical condition.     The patient